# Patient Record
Sex: FEMALE | Race: OTHER | NOT HISPANIC OR LATINO | Employment: FULL TIME | URBAN - METROPOLITAN AREA
[De-identification: names, ages, dates, MRNs, and addresses within clinical notes are randomized per-mention and may not be internally consistent; named-entity substitution may affect disease eponyms.]

---
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
## 2024-01-03 ENCOUNTER — ANCILLARY PROCEDURE (OUTPATIENT)
Dept: RADIOLOGY | Facility: CLINIC | Age: 19
End: 2024-01-03
Payer: COMMERCIAL

## 2024-01-03 DIAGNOSIS — Z34.91 INITIAL OBSTETRIC VISIT IN FIRST TRIMESTER (HHS-HCC): ICD-10-CM

## 2024-01-03 PROCEDURE — 76801 OB US < 14 WKS SINGLE FETUS: CPT

## 2024-01-03 PROCEDURE — 76801 OB US < 14 WKS SINGLE FETUS: CPT | Performed by: OBSTETRICS & GYNECOLOGY

## 2024-01-05 DIAGNOSIS — O21.9 NAUSEA AND VOMITING IN PREGNANCY PRIOR TO 22 WEEKS GESTATION (HHS-HCC): Primary | ICD-10-CM

## 2024-01-05 RX ORDER — PYRIDOXINE HCL (VITAMIN B6) 25 MG
25 TABLET ORAL 3 TIMES DAILY PRN
Qty: 90 TABLET | Refills: 1 | Status: SHIPPED | OUTPATIENT
Start: 2024-01-05 | End: 2024-01-15 | Stop reason: ALTCHOICE

## 2024-01-13 PROBLEM — Z3A.10 10 WEEKS GESTATION OF PREGNANCY (HHS-HCC): Status: RESOLVED | Noted: 2023-12-27 | Resolved: 2024-01-13

## 2024-01-13 PROBLEM — Z34.91 INITIAL OBSTETRIC VISIT IN FIRST TRIMESTER (HHS-HCC): Status: RESOLVED | Noted: 2023-12-27 | Resolved: 2024-01-13

## 2024-01-13 PROBLEM — Z23 NEEDS FLU SHOT: Status: RESOLVED | Noted: 2023-12-27 | Resolved: 2024-01-13

## 2024-01-13 PROBLEM — Z34.00 SUPERVISION OF NORMAL FIRST PREGNANCY, ANTEPARTUM (HHS-HCC): Status: ACTIVE | Noted: 2023-12-27

## 2024-01-13 NOTE — PROGRESS NOTES
Subjective   Kaylyn Payton is a 18 y.o.  at 13w3d with a working estimated date of delivery of 2024, by Ultrasound who presents for Centering visit #1.     1:1 Visit:   She denies vaginal bleeding, abdominal pain, or leakage of fluid.     Patient states that nausea and vomiting are must better.     Would like Myriad testing today. Amenable to ASA 81mg.     Objective   Physical Exam  Weight: 83.9 kg (185 lb), Pregravid BMI: 33.13  Expected Total Weight Gain: 5 kg (11 lb)-9 kg (19 lb)   BP: 136/74  Fetal Heart Rate: 130      Problem List Items Addressed This Visit       Supervision of normal first pregnancy, antepartum    Overview     Desired provider in labor: [x] CNM  [] Physician  [x] Initial BMI: 33.13  [x] Prenatal Labs:   [x] Rh status: A+  [x] Genetic Screening: ordered 1/15   [x] Baby ASA: prescribed 1/15  [x] Dating confirmation with US done at [facility]     [] Anatomy US:  [] Patient added to BirthTracks  [] 1hr GCT at 24-28wks:  [] 3 hr GTT (if indicated):  [] Fetal Surveillance (if indicated):    [] Tdap (27-36wks):  [] RSV (32-36wks)  [x] Flu Shot: completed 23  [] COVID vaccine:   [] Rubella equivocal needs MMR postpartum       [] Breastfeeding  [] Pain management during labor:   [] Postpartum Birth control method:     [] GBS at 36 wks:  [] 39 weeks discussion of IOL vs. Expectant management:  [] Mode of delivery:           History of asthma    Overview     No intubations or hospitalizations           Other Visit Diagnoses       13 weeks gestation of pregnancy    -  Primary    Relevant Medications    aspirin 81 mg chewable tablet    Other Relevant Orders    Myriad Prequel Prenatal Screen    Encounter for supervision of normal first pregnancy in second trimester                Centering Session #1 Plan:   -Anatomy US ordered ;   -The following educational material was reviewed:  Group expectations/guidelines/confidentiality form  BMI; IOM recommendations for weight gain in pregnancy based on  starting BMI  Nutrition including dietary restrictions, serving sizes  Healthy lifestyle choices   -Myriad testing ordered  -Reviewed reasons to call: heavy vaginal bleeding, loss of fluid, strong pelvic pain, any questions/concerns  -RTC for next Centering visit in 4 weeks     1:1 total time 10min  Centering Visit total time 120min    CHUNG Gutiérrez-KATELYN

## 2024-01-15 ENCOUNTER — LAB (OUTPATIENT)
Dept: LAB | Facility: LAB | Age: 19
End: 2024-01-15
Payer: COMMERCIAL

## 2024-01-15 ENCOUNTER — ROUTINE PRENATAL (OUTPATIENT)
Dept: OBSTETRICS AND GYNECOLOGY | Facility: CLINIC | Age: 19
End: 2024-01-15
Payer: COMMERCIAL

## 2024-01-15 VITALS — SYSTOLIC BLOOD PRESSURE: 136 MMHG | BODY MASS INDEX: 32.77 KG/M2 | DIASTOLIC BLOOD PRESSURE: 74 MMHG | WEIGHT: 185 LBS

## 2024-01-15 DIAGNOSIS — Z34.02 ENCOUNTER FOR SUPERVISION OF NORMAL FIRST PREGNANCY IN SECOND TRIMESTER (HHS-HCC): ICD-10-CM

## 2024-01-15 DIAGNOSIS — Z34.00 SUPERVISION OF NORMAL FIRST PREGNANCY, ANTEPARTUM (HHS-HCC): ICD-10-CM

## 2024-01-15 DIAGNOSIS — Z87.09 HISTORY OF ASTHMA: ICD-10-CM

## 2024-01-15 DIAGNOSIS — Z3A.13 13 WEEKS GESTATION OF PREGNANCY (HHS-HCC): Primary | ICD-10-CM

## 2024-01-15 DIAGNOSIS — Z3A.13 13 WEEKS GESTATION OF PREGNANCY (HHS-HCC): ICD-10-CM

## 2024-01-15 PROCEDURE — S9452 NUTRITION CLASS: HCPCS | Performed by: ADVANCED PRACTICE MIDWIFE

## 2024-01-15 PROCEDURE — 36415 COLL VENOUS BLD VENIPUNCTURE: CPT

## 2024-01-15 PROCEDURE — H1002 CARECOORDINATION PRENATAL: HCPCS | Performed by: ADVANCED PRACTICE MIDWIFE

## 2024-01-15 PROCEDURE — 99213 OFFICE O/P EST LOW 20 MIN: CPT | Performed by: ADVANCED PRACTICE MIDWIFE

## 2024-01-15 PROCEDURE — 99078 GROUP HEALTH EDUCATION: CPT | Performed by: ADVANCED PRACTICE MIDWIFE

## 2024-01-15 RX ORDER — NAPROXEN SODIUM 220 MG/1
81 TABLET, FILM COATED ORAL NIGHTLY
Qty: 90 TABLET | Refills: 3 | Status: SHIPPED | OUTPATIENT
Start: 2024-01-15 | End: 2024-04-14

## 2024-01-15 NOTE — LETTER
1/15/2024    To Whom It May Concern:    Kaylyn Payton is being followed for her pregnancy at [facility] [place].  Estimated Date of Delivery: 7/19/24    Sincerely,        PHONG Gutiérrez  [facility] [place]

## 2024-01-24 ENCOUNTER — APPOINTMENT (OUTPATIENT)
Dept: OBSTETRICS AND GYNECOLOGY | Facility: CLINIC | Age: 19
End: 2024-01-24
Payer: COMMERCIAL

## 2024-01-26 ENCOUNTER — TELEPHONE (OUTPATIENT)
Dept: OBSTETRICS AND GYNECOLOGY | Facility: HOSPITAL | Age: 19
End: 2024-01-26
Payer: COMMERCIAL

## 2024-01-26 NOTE — TELEPHONE ENCOUNTER
Called patient. Number went straight to voicemail. Vague voicemail left about wanting to talk about lab results. Encouraged patient to call back and speak with RN. Will also try to send AddFleethart message.

## 2024-02-02 ENCOUNTER — APPOINTMENT (OUTPATIENT)
Dept: CARDIOLOGY | Facility: HOSPITAL | Age: 19
End: 2024-02-02
Payer: COMMERCIAL

## 2024-02-02 ENCOUNTER — HOSPITAL ENCOUNTER (EMERGENCY)
Facility: HOSPITAL | Age: 19
Discharge: HOME | End: 2024-02-02
Attending: STUDENT IN AN ORGANIZED HEALTH CARE EDUCATION/TRAINING PROGRAM
Payer: COMMERCIAL

## 2024-02-02 VITALS
DIASTOLIC BLOOD PRESSURE: 68 MMHG | HEART RATE: 106 BPM | SYSTOLIC BLOOD PRESSURE: 123 MMHG | WEIGHT: 185 LBS | RESPIRATION RATE: 16 BRPM | HEIGHT: 65 IN | BODY MASS INDEX: 30.82 KG/M2 | OXYGEN SATURATION: 100 %

## 2024-02-02 DIAGNOSIS — E16.2 HYPOGLYCEMIA: ICD-10-CM

## 2024-02-02 DIAGNOSIS — R55 VASOVAGAL SYNCOPE: Primary | ICD-10-CM

## 2024-02-02 LAB
ALBUMIN SERPL BCP-MCNC: 3.9 G/DL (ref 3.4–5)
ALP SERPL-CCNC: 52 U/L (ref 33–110)
ALT SERPL W P-5'-P-CCNC: 13 U/L (ref 7–45)
ANION GAP SERPL CALC-SCNC: 11 MMOL/L (ref 10–20)
APPEARANCE UR: CLEAR
AST SERPL W P-5'-P-CCNC: 16 U/L (ref 9–39)
BASOPHILS # BLD AUTO: 0.02 X10*3/UL (ref 0–0.1)
BASOPHILS NFR BLD AUTO: 0.2 %
BILIRUB SERPL-MCNC: 0.2 MG/DL (ref 0–1.2)
BILIRUB UR STRIP.AUTO-MCNC: NEGATIVE MG/DL
BUN SERPL-MCNC: 5 MG/DL (ref 6–23)
CALCIUM SERPL-MCNC: 9.1 MG/DL (ref 8.6–10.3)
CHLORIDE SERPL-SCNC: 106 MMOL/L (ref 98–107)
CO2 SERPL-SCNC: 24 MMOL/L (ref 21–32)
COLOR UR: YELLOW
CREAT SERPL-MCNC: 0.51 MG/DL (ref 0.5–1.05)
D DIMER PPP FEU-MCNC: 777 NG/ML FEU
EGFRCR SERPLBLD CKD-EPI 2021: >90 ML/MIN/1.73M*2
EOSINOPHIL # BLD AUTO: 0.18 X10*3/UL (ref 0–0.7)
EOSINOPHIL NFR BLD AUTO: 1.9 %
ERYTHROCYTE [DISTWIDTH] IN BLOOD BY AUTOMATED COUNT: 12.7 % (ref 11.5–14.5)
GLUCOSE BLD MANUAL STRIP-MCNC: 122 MG/DL (ref 74–99)
GLUCOSE BLD MANUAL STRIP-MCNC: 65 MG/DL (ref 74–99)
GLUCOSE SERPL-MCNC: 71 MG/DL (ref 74–99)
GLUCOSE UR STRIP.AUTO-MCNC: NEGATIVE MG/DL
HCT VFR BLD AUTO: 38.8 % (ref 36–46)
HGB BLD-MCNC: 12.7 G/DL (ref 12–16)
IMM GRANULOCYTES # BLD AUTO: 0.04 X10*3/UL (ref 0–0.7)
IMM GRANULOCYTES NFR BLD AUTO: 0.4 % (ref 0–0.9)
KETONES UR STRIP.AUTO-MCNC: NEGATIVE MG/DL
LEUKOCYTE ESTERASE UR QL STRIP.AUTO: NEGATIVE
LYMPHOCYTES # BLD AUTO: 1.93 X10*3/UL (ref 1.2–4.8)
LYMPHOCYTES NFR BLD AUTO: 20.3 %
MCH RBC QN AUTO: 30.3 PG (ref 26–34)
MCHC RBC AUTO-ENTMCNC: 32.7 G/DL (ref 32–36)
MCV RBC AUTO: 93 FL (ref 80–100)
MONOCYTES # BLD AUTO: 0.68 X10*3/UL (ref 0.1–1)
MONOCYTES NFR BLD AUTO: 7.2 %
NEUTROPHILS # BLD AUTO: 6.66 X10*3/UL (ref 1.2–7.7)
NEUTROPHILS NFR BLD AUTO: 70 %
NITRITE UR QL STRIP.AUTO: NEGATIVE
NRBC BLD-RTO: 0 /100 WBCS (ref 0–0)
PH UR STRIP.AUTO: 8 [PH]
PLATELET # BLD AUTO: 364 X10*3/UL (ref 150–450)
POTASSIUM SERPL-SCNC: 3.7 MMOL/L (ref 3.5–5.3)
PROT SERPL-MCNC: 7.3 G/DL (ref 6.4–8.2)
PROT UR STRIP.AUTO-MCNC: NEGATIVE MG/DL
RBC # BLD AUTO: 4.19 X10*6/UL (ref 4–5.2)
RBC # UR STRIP.AUTO: NEGATIVE /UL
SARS-COV-2 RNA RESP QL NAA+PROBE: NOT DETECTED
SODIUM SERPL-SCNC: 137 MMOL/L (ref 136–145)
SP GR UR STRIP.AUTO: 1.01
UROBILINOGEN UR STRIP.AUTO-MCNC: <2 MG/DL
WBC # BLD AUTO: 9.5 X10*3/UL (ref 4.4–11.3)

## 2024-02-02 PROCEDURE — 81003 URINALYSIS AUTO W/O SCOPE: CPT | Performed by: NURSE PRACTITIONER

## 2024-02-02 PROCEDURE — 93005 ELECTROCARDIOGRAM TRACING: CPT

## 2024-02-02 PROCEDURE — 99283 EMERGENCY DEPT VISIT LOW MDM: CPT | Mod: 25

## 2024-02-02 PROCEDURE — 96361 HYDRATE IV INFUSION ADD-ON: CPT

## 2024-02-02 PROCEDURE — 2500000004 HC RX 250 GENERAL PHARMACY W/ HCPCS (ALT 636 FOR OP/ED): Performed by: NURSE PRACTITIONER

## 2024-02-02 PROCEDURE — 82947 ASSAY GLUCOSE BLOOD QUANT: CPT

## 2024-02-02 PROCEDURE — 99285 EMERGENCY DEPT VISIT HI MDM: CPT | Performed by: STUDENT IN AN ORGANIZED HEALTH CARE EDUCATION/TRAINING PROGRAM

## 2024-02-02 PROCEDURE — 87635 SARS-COV-2 COVID-19 AMP PRB: CPT | Performed by: NURSE PRACTITIONER

## 2024-02-02 PROCEDURE — 96360 HYDRATION IV INFUSION INIT: CPT

## 2024-02-02 PROCEDURE — 36415 COLL VENOUS BLD VENIPUNCTURE: CPT | Performed by: NURSE PRACTITIONER

## 2024-02-02 PROCEDURE — 85025 COMPLETE CBC W/AUTO DIFF WBC: CPT | Performed by: NURSE PRACTITIONER

## 2024-02-02 PROCEDURE — 80053 COMPREHEN METABOLIC PANEL: CPT | Performed by: NURSE PRACTITIONER

## 2024-02-02 PROCEDURE — 85379 FIBRIN DEGRADATION QUANT: CPT | Performed by: NURSE PRACTITIONER

## 2024-02-02 RX ADMIN — SODIUM CHLORIDE, POTASSIUM CHLORIDE, SODIUM LACTATE AND CALCIUM CHLORIDE 1000 ML: 600; 310; 30; 20 INJECTION, SOLUTION INTRAVENOUS at 15:02

## 2024-02-02 ASSESSMENT — PAIN SCALES - GENERAL: PAINLEVEL_OUTOF10: 0 - NO PAIN

## 2024-02-02 ASSESSMENT — COLUMBIA-SUICIDE SEVERITY RATING SCALE - C-SSRS
2. HAVE YOU ACTUALLY HAD ANY THOUGHTS OF KILLING YOURSELF?: NO
1. IN THE PAST MONTH, HAVE YOU WISHED YOU WERE DEAD OR WISHED YOU COULD GO TO SLEEP AND NOT WAKE UP?: NO
6. HAVE YOU EVER DONE ANYTHING, STARTED TO DO ANYTHING, OR PREPARED TO DO ANYTHING TO END YOUR LIFE?: NO

## 2024-02-02 ASSESSMENT — PAIN - FUNCTIONAL ASSESSMENT: PAIN_FUNCTIONAL_ASSESSMENT: 0-10

## 2024-02-02 ASSESSMENT — LIFESTYLE VARIABLES
EVER FELT BAD OR GUILTY ABOUT YOUR DRINKING: NO
HAVE PEOPLE ANNOYED YOU BY CRITICIZING YOUR DRINKING: NO
EVER HAD A DRINK FIRST THING IN THE MORNING TO STEADY YOUR NERVES TO GET RID OF A HANGOVER: NO
HAVE YOU EVER FELT YOU SHOULD CUT DOWN ON YOUR DRINKING: NO

## 2024-02-02 ASSESSMENT — PAIN DESCRIPTION - PROGRESSION: CLINICAL_PROGRESSION: NOT CHANGED

## 2024-02-02 NOTE — ED PROVIDER NOTES
HPI   Chief Complaint   Patient presents with   • Syncope     Pt had a syncopal episode at work earlier today. Pt was advised by her boss to be evaluated. Pt stated she is 16 weeks pregnant. Pt is  with no complications thus far. Pt denies any history of syncope in the past. Denies being anemic and stated she is eating and drinking regularly.         Patient is a 18-year-old female who is 16 weeks OB, G2, P0, with 1 spontaneous , who presents ED today due to an episode of dizziness at work.  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  Patient's coworker states that she did pass out in her chair.  Patient denies any palpitations or chest pains.  Patient denies any nausea or vomiting.  Patient currently feels okay.  Patient states that she did eat and drink normally this morning.      History provided by:  Patient   used: No                        No data recorded                Patient History   Past Medical History:   Diagnosis Date   • Other specified health status 2015    No pertinent past surgical history   • Personal history of other diseases of the respiratory system 2015    History of asthma     No past surgical history on file.  Family History   Problem Relation Name Age of Onset   • Diabetes Mother       Social History     Tobacco Use   • Smoking status: Never   • Smokeless tobacco: Never   Vaping Use   • Vaping Use: Never used   Substance Use Topics   • Alcohol use: Not on file   • Drug use: Never       Physical Exam   ED Triage Vitals   Temp Pulse Resp BP   -- -- -- --      SpO2 Temp src Heart Rate Source Patient Position   -- -- -- --      BP Location FiO2 (%)     -- --       Physical Exam  Vitals and nursing note reviewed.   Constitutional:       General: She is not in acute distress.     Appearance: She is well-developed.   HENT:      Head: Normocephalic and atraumatic.   Eyes:      Conjunctiva/sclera:  Conjunctivae normal.   Cardiovascular:      Rate and Rhythm: Normal rate and regular rhythm.      Heart sounds: No murmur heard.  Pulmonary:      Effort: Pulmonary effort is normal. No respiratory distress.      Breath sounds: Normal breath sounds.   Abdominal:      General: Abdomen is protuberant.      Tenderness: There is no abdominal tenderness. There is no right CVA tenderness or left CVA tenderness.      Comments: Gravid uterus   Musculoskeletal:         General: No swelling.      Cervical back: Neck supple.   Skin:     General: Skin is warm and dry.      Capillary Refill: Capillary refill takes less than 2 seconds.   Neurological:      Mental Status: She is alert.   Psychiatric:         Mood and Affect: Mood normal.       ED Course & MDM   ED Course as of 02/06/24 1010   Fri Feb 02, 2024   1510 CBC and Auto Differential  CBC stable, no leukocytosis, anemia, or thrombocytopenia [WS]   1512 ECG 12 lead  EKG, my read, 1454  Normal sinus rhythm, no acute ST elevation or depression.  Ventricular rate-92 BPM [WS]   1516 Urinalysis with Reflex Culture and Microscopic  Normal Urinalysis. [WS]   1519 D-Dimer, Quantitative VTE Exclusion(!): 777  YEARS negative [AB]   1523 D-dimer, VTE Exclusion(!)  Slightly elevated blood pressure years criteria she is low risk for PE, I did discuss the possibility of CT for the patient's and risks and she would not like it at this time. [WS]   1524 Comprehensive metabolic panel(!)  No electrolyte abnormalities, kidney injury, or elevated liver enzymes consistent with liver pathology.  Patient is slightly hyperglycemic, she will be given foods and this will be rechecked. [WS]   1607 SARS-CoV-2 RT PCR  Negative [WS]   1609 Orthostatic vital signs did not show a significant increase in heart rate or drop in blood pressure. [WS]   1623 POCT GLUCOSE(!)  Patient is still alert, will be given more food and rechecked. [WS]   1726  bpm by nursing [AB]      ED Course User Index  [AB]  Glenn Solis MD  [WS] CHUNG Vences-CNP         Diagnoses as of 02/06/24 1010   Vasovagal syncope   Hypoglycemia       Medical Decision Making  Differential diagnosis: Hypoglycemia, dehydration, electrolyte normality, HELLP, UTI, PE.  Patient's vital signs initially stable, repeat vital signs during orthostatics had slight increase in heart rate initially and remained slightly elevated.  Seem to stabilize when seated.  Remaining vital signs were stable, no significant drop in blood pressure.  Patient's lab work did show mild hyperglycemia, she was given small amount of food and this did not improve, she will be given more and rechecked.  Remaining testing.  Stable.  D-dimer was slightly elevated but per years criteria does not meet criteria for CT.  I did offer as this is on the differential but patient is on at this time due to risk to fetus.  I spoke with Dr. Voss, who states that she can follow-up with her OB in the next few days as everything appears stable based on our workup.    Amount and/or Complexity of Data Reviewed  Labs: ordered. Decision-making details documented in ED Course.  ECG/medicine tests: ordered and independent interpretation performed. Decision-making details documented in ED Course.    Risk  Prescription drug management.        Procedure  Procedures     CHUNG Vences-ASUNCION  02/06/24 1010

## 2024-02-03 LAB — HOLD SPECIMEN: NORMAL

## 2024-02-06 LAB
ATRIAL RATE: 92 BPM
P AXIS: 2 DEGREES
P OFFSET: 189 MS
P ONSET: 148 MS
PR INTERVAL: 142 MS
Q ONSET: 219 MS
QRS COUNT: 15 BEATS
QRS DURATION: 80 MS
QT INTERVAL: 352 MS
QTC CALCULATION(BAZETT): 435 MS
QTC FREDERICIA: 405 MS
R AXIS: 21 DEGREES
T AXIS: 2 DEGREES
T OFFSET: 395 MS
VENTRICULAR RATE: 92 BPM

## 2024-02-07 ENCOUNTER — DOCUMENTATION (OUTPATIENT)
Dept: OBSTETRICS AND GYNECOLOGY | Facility: CLINIC | Age: 19
End: 2024-02-07
Payer: COMMERCIAL

## 2024-02-07 NOTE — PROGRESS NOTES
Pt was called at request of Tomasa Fox CNM as pt was seen earlier this month in er as she had a dizzy spell and passed out at work. Pt was worked up by ER. Pt states she is feeling better but will want to talk to CNM at appointment on Monday. Also spoke to pt about not communicating for urgent mtters via my chart as we may not see message urgently. Pt advised if it is non urgen to use my chart- but for any urgent issue it is best to call us. Pt verbalizes understanding

## 2024-02-10 PROBLEM — Z34.00 SUPERVISION OF NORMAL FIRST PREGNANCY, ANTEPARTUM (HHS-HCC): Status: RESOLVED | Noted: 2023-12-27 | Resolved: 2024-02-10

## 2024-02-10 NOTE — PROGRESS NOTES
Subjective     Kaylyn Payton is a 18 y.o.  at 17w3d with a working estimated date of delivery of 2024, by Ultrasound who presents for a routine prenatal visit.     She denies vaginal bleeding, leakage of fluid, decreased fetal movements, or contractions.    Patient eating, has food at home. Wondering about not gaining weight so far.     Patient seen in ED for syncope. Since then she has been working on making sure she is eating enough and drinking enough water.     Objective   Physical Exam  Weight: 83.9 kg (185 lb)  Expected Total Weight Gain: 5 kg (11 lb)-9 kg (19 lb)   Pregravid BMI: 31.12  BP: 118/77  Fetal Heart Rate: 145 Fundal Height (cm): 18 cm      Problem List Items Addressed This Visit       Supervision of normal first pregnancy, antepartum - Primary    Overview     Desired provider in labor: [x] CNM  [] Physician  [x] Initial BMI: 33.13  [x] Prenatal Labs:   [x] Rh status: A+  [x] Genetic Screening: rr cf DNA   [x] Baby ASA: prescribed 1/15  [x] Dating confirmation with US done at [facility]     [] Anatomy US:  [x] Fetal Sex: female  [] Patient added to BirthTracks  [] 1hr GCT at 24-28wks:  [] 3 hr GTT (if indicated):  [] Fetal Surveillance (if indicated):    [] Tdap (27-36wks):  [] RSV (32-36wks)  [x] Flu Shot: completed 23  [] Rubella equivocal needs MMR postpartum       [] Breastfeeding  [] Pain management during labor:   [] Postpartum Birth control method:     [] GBS at 36 wks:  [] 39 weeks discussion of IOL vs. Expectant management:  [] Mode of delivery:           Syncope    Overview     Encouraged high protein and increasing water intake             Anatomy US scheduled ;  -Reviewed reasons to call CNM on-call: vaginal bleeding, loss of fluid, increased pelvic pain/contractions, or any questions/concerns  -RTC in 4 weeks or prn for next Centering visit     CHUNG Gutiérrez-KATELYN

## 2024-02-12 ENCOUNTER — ROUTINE PRENATAL (OUTPATIENT)
Dept: OBSTETRICS AND GYNECOLOGY | Facility: CLINIC | Age: 19
End: 2024-02-12
Payer: COMMERCIAL

## 2024-02-12 VITALS — SYSTOLIC BLOOD PRESSURE: 118 MMHG | WEIGHT: 185 LBS | DIASTOLIC BLOOD PRESSURE: 77 MMHG | BODY MASS INDEX: 30.79 KG/M2

## 2024-02-12 DIAGNOSIS — R55 SYNCOPE, UNSPECIFIED SYNCOPE TYPE: ICD-10-CM

## 2024-02-12 DIAGNOSIS — Z34.00 SUPERVISION OF NORMAL FIRST PREGNANCY, ANTEPARTUM (HHS-HCC): Primary | ICD-10-CM

## 2024-02-12 PROCEDURE — 99213 OFFICE O/P EST LOW 20 MIN: CPT | Performed by: ADVANCED PRACTICE MIDWIFE

## 2024-02-12 PROCEDURE — H1002 CARECOORDINATION PRENATAL: HCPCS | Performed by: ADVANCED PRACTICE MIDWIFE

## 2024-02-12 PROCEDURE — 99078 GROUP HEALTH EDUCATION: CPT | Performed by: ADVANCED PRACTICE MIDWIFE

## 2024-02-28 ENCOUNTER — HOSPITAL ENCOUNTER (OUTPATIENT)
Dept: RADIOLOGY | Facility: CLINIC | Age: 19
Discharge: HOME | End: 2024-02-28
Payer: COMMERCIAL

## 2024-02-28 DIAGNOSIS — Z36.2 ENCOUNTER FOR OTHER ANTENATAL SCREENING FOLLOW-UP (HHS-HCC): ICD-10-CM

## 2024-02-28 PROCEDURE — 76811 OB US DETAILED SNGL FETUS: CPT

## 2024-02-28 PROCEDURE — 76811 OB US DETAILED SNGL FETUS: CPT | Performed by: OBSTETRICS & GYNECOLOGY

## 2024-03-04 ENCOUNTER — TELEPHONE (OUTPATIENT)
Dept: OBSTETRICS AND GYNECOLOGY | Facility: CLINIC | Age: 19
End: 2024-03-04
Payer: COMMERCIAL

## 2024-03-07 ENCOUNTER — TELEPHONE (OUTPATIENT)
Dept: OBSTETRICS AND GYNECOLOGY | Facility: CLINIC | Age: 19
End: 2024-03-07
Payer: COMMERCIAL

## 2024-03-07 NOTE — TELEPHONE ENCOUNTER
----- Message from Darlene Barraza RN sent at 3/4/2024  2:22 PM EST -----  Regarding: FW: Phone call from dr ariza  Contact: [phone]  [facility]  ----- Message -----  From: Aye Mckee  Sent: 3/4/2024   2:18 PM EST  To: Yovani Hunter Clinical Support Staff  Subject: FW: Phone call from dr ariza                     ----- Message -----  From: Kaylyn Payton  Sent: 3/1/2024  10:17 PM EST  To: [facility] [employer]  Subject: Phone call from dr annita French   I know you won’t get this right away and it’s not urgent but i have a few questions i needed to ask you and get some information on before our next appointment so if you could call me at your earliest convince thank you!

## 2024-03-08 NOTE — PROGRESS NOTES
Subjective   Kaylyn Payton is a 18 y.o.  at 21w3d with a working estimated date of delivery of 2024, by Ultrasound who presents for Centering #3.     1:1 Visit:  Patient doing ok. She was able to talk with a  provider last week about her ADHD medication and so feels much more in control of that.     She denies vaginal bleeding, leakage of fluid, decreased fetal movements, or contractions/strong pelvic pain. Patient denies any more syncope. Happy about weight gain.     Objective   Physical Exam:   Weight: 86.3 kg (190 lb 4.8 oz)  Expected Total Weight Gain: 5 kg (11 lb)-9 kg (19 lb)   Pregravid BMI: 31.12  BP: 143/88 --> patient left before she was able to have BP rechecked though we talked with her about that need  Fetal Heart Rate: 155 Fundal Height (cm): 22 cm             Problem List Items Addressed This Visit       Supervision of normal first pregnancy, antepartum - Primary    Overview     Desired provider in labor: [x] CNM  [] Physician  [x] Initial BMI: 33.13  [x] Prenatal Labs: WNL except rubella non immune  [x] Rh status: A+  [x] Genetic Screening: rr cf DNA   [x] Baby ASA: prescribed 1/15  [x] Dating confirmation with US done at [facility]     [x] Anatomy US: WNL   [x] Fetal Sex: female  [] Patient added to BirthTracks  [] 1hr GCT at 24-28wks:  [] 3 hr GTT (if indicated):  [] Fetal Surveillance (if indicated):    [] Tdap (27-36wks):  [] RSV (32-36wks)  [x] Flu Shot: completed 23    [] Breastfeeding  [] Pain management during labor:   [] Postpartum Birth control method:   [] GBS at 36 wks:  [] Labor support:         Elevated BP without diagnosis of hypertension    Overview     3/11: 143/88, patient left before repeat was possible            Centering Session #3 Plan:   Discussed diabetes screening and routine labs, to be completed next visit  -The following educational material was reviewed:  Dealing with stressors  Mental relaxation practice  Intimate partner violence    labor  signs/symptoms  -Reviewed reasons to call CNM on-call:  vaginal bleeding, loss of fluid, severe pelvic pain, or any questions/concerns  -Patient called and texted after visit about need to RTC asap for BP recheck  -RTC for next Centering visit in 4 weeks or prn   *next visit: GCT, BP    1:1 total time 10min  Centering Visit total time 120min     CHUNG Gutiérrez-KATELYN

## 2024-03-11 ENCOUNTER — ROUTINE PRENATAL (OUTPATIENT)
Dept: OBSTETRICS AND GYNECOLOGY | Facility: CLINIC | Age: 19
End: 2024-03-11
Payer: COMMERCIAL

## 2024-03-11 VITALS — BODY MASS INDEX: 31.67 KG/M2 | WEIGHT: 190.3 LBS | DIASTOLIC BLOOD PRESSURE: 88 MMHG | SYSTOLIC BLOOD PRESSURE: 143 MMHG

## 2024-03-11 DIAGNOSIS — R03.0 ELEVATED BP WITHOUT DIAGNOSIS OF HYPERTENSION: ICD-10-CM

## 2024-03-11 DIAGNOSIS — Z34.00 SUPERVISION OF NORMAL FIRST PREGNANCY, ANTEPARTUM (HHS-HCC): Primary | ICD-10-CM

## 2024-03-11 PROCEDURE — 99078 GROUP HEALTH EDUCATION: CPT | Performed by: ADVANCED PRACTICE MIDWIFE

## 2024-03-11 PROCEDURE — 99213 OFFICE O/P EST LOW 20 MIN: CPT | Performed by: ADVANCED PRACTICE MIDWIFE

## 2024-03-11 PROCEDURE — H1002 CARECOORDINATION PRENATAL: HCPCS | Performed by: ADVANCED PRACTICE MIDWIFE

## 2024-03-11 ASSESSMENT — EDINBURGH POSTNATAL DEPRESSION SCALE (EPDS)
I HAVE BEEN SO UNHAPPY THAT I HAVE BEEN CRYING: ONLY OCCASIONALLY
I HAVE BEEN ANXIOUS OR WORRIED FOR NO GOOD REASON: YES, SOMETIMES
TOTAL SCORE: 9
I HAVE FELT SAD OR MISERABLE: NOT VERY OFTEN
THE THOUGHT OF HARMING MYSELF HAS OCCURRED TO ME: NEVER
I HAVE BLAMED MYSELF UNNECESSARILY WHEN THINGS WENT WRONG: NOT VERY OFTEN
I HAVE BEEN ABLE TO LAUGH AND SEE THE FUNNY SIDE OF THINGS: AS MUCH AS I ALWAYS COULD
I HAVE LOOKED FORWARD WITH ENJOYMENT TO THINGS: RATHER LESS THAN I USED TO
I HAVE FELT SCARED OR PANICKY FOR NO GOOD REASON: YES, SOMETIMES
THINGS HAVE BEEN GETTING ON TOP OF ME: NO, MOST OF THE TIME I HAVE COPED QUITE WELL
I HAVE BEEN SO UNHAPPY THAT I HAVE HAD DIFFICULTY SLEEPING: NOT AT ALL

## 2024-03-21 DIAGNOSIS — M54.30 SCIATICA, UNSPECIFIED LATERALITY: Primary | ICD-10-CM

## 2024-04-02 NOTE — PROGRESS NOTES
Subjective   Kaylyn Payton is a 18 y.o.  at 25w3d with a working estimated date of delivery of 2024, by Ultrasound who presents for Centering #4.    1:1 Visit:  She denies vaginal bleeding, leakage of fluid, or strong/consistent contractions. Endorses good fetal movement.     Patient has another episodes of dizziness at work yesterday. She was eating/drinking enough. She started feeling hot/had blurry vision and ate mac and cheese.     Sciatic pain still bad, but she starts PT next week.     In middle of GCT.     Objective   Physical Exam  Weight: 88.5 kg (195 lb)  Expected Total Weight Gain: 5 kg (11 lb)-9 kg (19 lb)   Pregravid BMI: 31.12  BP: 112/79  Fetal Heart Rate: 25 Fundal Height (cm): 140 cm    Problem List Items Addressed This Visit       Supervision of normal first pregnancy, antepartum    Overview     Desired provider in labor: [x] CNM  [] Physician  [x] Initial BMI: 33.13  [x] Prenatal Labs: WNL except rubella non immune  [x] Rh status: A+  [x] Genetic Screening: rr cf DNA   [x] Baby ASA: prescribed 1/15  [x] Dating confirmation with US done at [facility]     [x] Anatomy US: WNL   [x] Fetal Sex: female  [] Patient added to BirthTracks  [] 1hr GCT at 24-28wks:  [] 3 hr GTT (if indicated):  [] Fetal Surveillance (if indicated):    [] Tdap (27-36wks):  [] RSV (32-36wks)  [x] Flu Shot: completed 23    [] Breastfeeding  [] Pain management during labor:   [x] Postpartum Birth control method:  Mirena IUD immediate PP  [] GBS at 36 wks:  [] Labor support:         Elevated BP without diagnosis of hypertension    Overview     3/11: 143/88, patient left before repeat was possible          Other Visit Diagnoses       24 weeks gestation of pregnancy    -  Primary    Relevant Orders    CBC Anemia Panel With Reflex,Pregnancy    Glucose, 1 Hour Screen, Pregnancy    Syphilis Screen with Reflex            Centering Session #4 Plan:   Discussed diabetes screening and routine labs, to be completed  today  Benefits of breastfeeding discussed with patient, breast pump ordered  -The following educational material was reviewed:  Birth control  Breastfeeding benefits   Family I want to have   -Reviewed reasons to call CNM on-call: vaginal bleeding, loss of fluid, increased pelvic pain/contractions, or any questions/concerns  -RTC in 2 weeks for next Centering visit or prn  *next visit: Tdap     1:1 total time 10min  Centering Visit total time 120min    CHUNG Gutiérrez-KATELYN

## 2024-04-08 ENCOUNTER — ROUTINE PRENATAL (OUTPATIENT)
Dept: OBSTETRICS AND GYNECOLOGY | Facility: CLINIC | Age: 19
End: 2024-04-08
Payer: COMMERCIAL

## 2024-04-08 ENCOUNTER — LAB (OUTPATIENT)
Dept: LAB | Facility: LAB | Age: 19
End: 2024-04-08
Payer: COMMERCIAL

## 2024-04-08 VITALS — DIASTOLIC BLOOD PRESSURE: 79 MMHG | BODY MASS INDEX: 32.45 KG/M2 | WEIGHT: 195 LBS | SYSTOLIC BLOOD PRESSURE: 112 MMHG

## 2024-04-08 DIAGNOSIS — Z34.00 SUPERVISION OF NORMAL FIRST PREGNANCY, ANTEPARTUM (HHS-HCC): ICD-10-CM

## 2024-04-08 DIAGNOSIS — Z3A.25 25 WEEKS GESTATION OF PREGNANCY (HHS-HCC): Primary | ICD-10-CM

## 2024-04-08 DIAGNOSIS — Z3A.24 24 WEEKS GESTATION OF PREGNANCY (HHS-HCC): ICD-10-CM

## 2024-04-08 DIAGNOSIS — R03.0 ELEVATED BP WITHOUT DIAGNOSIS OF HYPERTENSION: ICD-10-CM

## 2024-04-08 LAB
ERYTHROCYTE [DISTWIDTH] IN BLOOD BY AUTOMATED COUNT: 13.5 % (ref 11.5–14.5)
FERRITIN SERPL-MCNC: 19 NG/ML
FOLATE SERPL-MCNC: 12.9 NG/ML
GLUCOSE 1H P 50 G GLC PO SERPL-MCNC: 127 MG/DL
HCT VFR BLD AUTO: 33.2 % (ref 36–46)
HGB BLD-MCNC: 10.4 G/DL (ref 12–16)
IRON SATN MFR SERPL: 8 %
IRON SERPL-MCNC: 34 UG/DL
MCH RBC QN AUTO: 29.5 PG (ref 26–34)
MCHC RBC AUTO-ENTMCNC: 31.3 G/DL (ref 32–36)
MCV RBC AUTO: 94 FL (ref 80–100)
NRBC BLD-RTO: 0 /100 WBCS (ref 0–0)
PLATELET # BLD AUTO: 351 X10*3/UL (ref 150–450)
RBC # BLD AUTO: 3.53 X10*6/UL (ref 4–5.2)
REFLEX ADDED, ANEMIA PANEL: NORMAL
TIBC SERPL-MCNC: 449 UG/DL
TREPONEMA PALLIDUM IGG+IGM AB [PRESENCE] IN SERUM OR PLASMA BY IMMUNOASSAY: NONREACTIVE
UIBC SERPL-MCNC: 415 UG/DL
VIT B12 SERPL-MCNC: 227 PG/ML
WBC # BLD AUTO: 10.4 X10*3/UL (ref 4.4–11.3)

## 2024-04-08 PROCEDURE — 99078 GROUP HEALTH EDUCATION: CPT | Performed by: ADVANCED PRACTICE MIDWIFE

## 2024-04-08 PROCEDURE — 99213 OFFICE O/P EST LOW 20 MIN: CPT | Performed by: ADVANCED PRACTICE MIDWIFE

## 2024-04-08 PROCEDURE — 83550 IRON BINDING TEST: CPT

## 2024-04-08 PROCEDURE — 82746 ASSAY OF FOLIC ACID SERUM: CPT

## 2024-04-08 PROCEDURE — 82607 VITAMIN B-12: CPT

## 2024-04-08 PROCEDURE — 82728 ASSAY OF FERRITIN: CPT

## 2024-04-08 PROCEDURE — 85027 COMPLETE CBC AUTOMATED: CPT

## 2024-04-08 PROCEDURE — 86780 TREPONEMA PALLIDUM: CPT

## 2024-04-08 PROCEDURE — 36415 COLL VENOUS BLD VENIPUNCTURE: CPT

## 2024-04-08 PROCEDURE — 82947 ASSAY GLUCOSE BLOOD QUANT: CPT

## 2024-04-08 PROCEDURE — H1002 CARECOORDINATION PRENATAL: HCPCS | Performed by: ADVANCED PRACTICE MIDWIFE

## 2024-04-09 DIAGNOSIS — D50.9 IRON DEFICIENCY ANEMIA, UNSPECIFIED IRON DEFICIENCY ANEMIA TYPE: ICD-10-CM

## 2024-04-09 DIAGNOSIS — Z3A.25 25 WEEKS GESTATION OF PREGNANCY (HHS-HCC): Primary | ICD-10-CM

## 2024-04-09 RX ORDER — DOCUSATE SODIUM 100 MG/1
100 CAPSULE, LIQUID FILLED ORAL NIGHTLY PRN
Qty: 30 CAPSULE | Refills: 3 | Status: SHIPPED | OUTPATIENT
Start: 2024-04-09

## 2024-04-09 RX ORDER — QUINIDINE GLUCONATE 324 MG
480 TABLET, EXTENDED RELEASE ORAL EVERY OTHER DAY
Qty: 60 TABLET | Refills: 3 | Status: SHIPPED | OUTPATIENT
Start: 2024-04-09 | End: 2024-05-13 | Stop reason: SDUPTHER

## 2024-04-16 ENCOUNTER — HOSPITAL ENCOUNTER (EMERGENCY)
Facility: HOSPITAL | Age: 19
Discharge: OTHER INSTITUTION | End: 2024-04-17
Attending: EMERGENCY MEDICINE
Payer: COMMERCIAL

## 2024-04-16 DIAGNOSIS — O26.892 ABDOMINAL PAIN DURING PREGNANCY IN SECOND TRIMESTER (HHS-HCC): Primary | ICD-10-CM

## 2024-04-16 DIAGNOSIS — R10.9 ABDOMINAL PAIN DURING PREGNANCY IN SECOND TRIMESTER (HHS-HCC): Primary | ICD-10-CM

## 2024-04-16 PROCEDURE — 99285 EMERGENCY DEPT VISIT HI MDM: CPT

## 2024-04-16 ASSESSMENT — LIFESTYLE VARIABLES
EVER HAD A DRINK FIRST THING IN THE MORNING TO STEADY YOUR NERVES TO GET RID OF A HANGOVER: NO
HAVE PEOPLE ANNOYED YOU BY CRITICIZING YOUR DRINKING: NO
HAVE YOU EVER FELT YOU SHOULD CUT DOWN ON YOUR DRINKING: NO
TOTAL SCORE: 0
EVER FELT BAD OR GUILTY ABOUT YOUR DRINKING: NO

## 2024-04-16 ASSESSMENT — PAIN - FUNCTIONAL ASSESSMENT: PAIN_FUNCTIONAL_ASSESSMENT: 0-10

## 2024-04-16 ASSESSMENT — COLUMBIA-SUICIDE SEVERITY RATING SCALE - C-SSRS
6. HAVE YOU EVER DONE ANYTHING, STARTED TO DO ANYTHING, OR PREPARED TO DO ANYTHING TO END YOUR LIFE?: NO
2. HAVE YOU ACTUALLY HAD ANY THOUGHTS OF KILLING YOURSELF?: NO
1. IN THE PAST MONTH, HAVE YOU WISHED YOU WERE DEAD OR WISHED YOU COULD GO TO SLEEP AND NOT WAKE UP?: NO

## 2024-04-16 ASSESSMENT — PAIN DESCRIPTION - DESCRIPTORS: DESCRIPTORS: ACHING;CRAMPING

## 2024-04-16 ASSESSMENT — PAIN DESCRIPTION - ONSET: ONSET: OTHER (COMMENT)

## 2024-04-16 ASSESSMENT — PAIN DESCRIPTION - PAIN TYPE: TYPE: ACUTE PAIN

## 2024-04-16 ASSESSMENT — PAIN SCALES - GENERAL: PAINLEVEL_OUTOF10: 6

## 2024-04-16 ASSESSMENT — PAIN DESCRIPTION - LOCATION: LOCATION: ABDOMEN

## 2024-04-16 ASSESSMENT — PAIN DESCRIPTION - ORIENTATION: ORIENTATION: LOWER;MID

## 2024-04-17 ENCOUNTER — HOSPITAL ENCOUNTER (INPATIENT)
Age: 19
End: 2024-04-17
Attending: OBSTETRICS & GYNECOLOGY | Admitting: OBSTETRICS & GYNECOLOGY
Payer: COMMERCIAL

## 2024-04-17 ENCOUNTER — HOSPITAL ENCOUNTER (INPATIENT)
Facility: HOSPITAL | Age: 19
End: 2024-04-17
Attending: OBSTETRICS & GYNECOLOGY | Admitting: OBSTETRICS & GYNECOLOGY
Payer: COMMERCIAL

## 2024-04-17 VITALS
RESPIRATION RATE: 16 BRPM | DIASTOLIC BLOOD PRESSURE: 83 MMHG | WEIGHT: 188 LBS | TEMPERATURE: 97 F | HEIGHT: 64 IN | BODY MASS INDEX: 32.1 KG/M2 | HEART RATE: 110 BPM | SYSTOLIC BLOOD PRESSURE: 134 MMHG | OXYGEN SATURATION: 99 %

## 2024-04-17 NOTE — ED TRIAGE NOTES
Pt arrived to the ED with c/o moderate abdominal cramping. Pt states she is 26 weeks pregnant and she tried contacting her OB Doc but there was no call back. Pt states she had a miscarriage in June of 2023 where she was 8 weeks. Pt sad she had light spotting earlier today.

## 2024-04-17 NOTE — ED PROVIDER NOTES
HPI   Chief Complaint   Patient presents with    Abdominal Cramping     Pt arrived to the ED with c/o moderate abdominal cramping. Pt states she is 26 weeks pregnant and she tried contacting her OB Doc but there was no call back. Pt states she had a miscarriage in 2023 where she was 8 weeks. Pt sad she had light spotting earlier today.        Patient is 26 weeks pregnant and is  A1.  She has received prenatal care through nurse practitioner with [facility].  She is having abdominal cramping for the past 2 hours that lasts 1 to 2 minutes at a time and occurs every 10 minutes.  She had some spotting earlier today which has since resolved.  She denies any other symptoms.  No complications with this pregnancy.  Positive fetal movement.  No leakage of fluid.                          [place] Coma Scale Score: 15                     Patient History   Past Medical History:   Diagnosis Date    ADHD     Asthma ([facility])      History reviewed. No pertinent surgical history.  Family History   Problem Relation Name Age of Onset    Diabetes Mother       Social History     Tobacco Use    Smoking status: Never    Smokeless tobacco: Never   Vaping Use    Vaping status: Never Used   Substance Use Topics    Alcohol use: Not on file    Drug use: Never       Physical Exam   ED Triage Vitals [24 2316]   Temperature Heart Rate Respirations BP   36.1 °C (97 °F) (!) 109 16 145/85      Pulse Ox Temp Source Heart Rate Source Patient Position   100 % Temporal -- --      BP Location FiO2 (%)     -- --       Physical Exam  Vitals and nursing note reviewed.   Constitutional:       General: She is not in acute distress.     Appearance: She is well-developed.   HENT:      Head: Normocephalic and atraumatic.   Eyes:      Conjunctiva/sclera: Conjunctivae normal.   Cardiovascular:      Rate and Rhythm: Normal rate and regular rhythm.      Heart sounds: No murmur heard.  Pulmonary:      Effort: Pulmonary effort is normal.  No respiratory distress.      Breath sounds: Normal breath sounds.   Abdominal:      Palpations: Abdomen is soft.      Tenderness: There is no abdominal tenderness. There is no guarding.   Musculoskeletal:         General: No swelling.      Cervical back: Neck supple.   Skin:     General: Skin is warm and dry.      Capillary Refill: Capillary refill takes less than 2 seconds.   Neurological:      Mental Status: She is alert.   Psychiatric:         Mood and Affect: Mood normal.         ED Course & MDM   Diagnoses as of 24 0017   Abdominal pain during pregnancy in second trimester ([facility])       Medical Decision Making  Differential diagnosis is  labor, ligament pain, abdominal pain, etc.    Patient's fetal heart tones are 136.  Positive fetal movement on ultrasound.  Her cramping has not been active while here in the emergency department.  She is resting comfortably.  She wants to go to private vehicle for monitoring.  She was accepted by Dr. Bliss and will go to [facility] 2 triage.  Prior medical records were reviewed.        Procedure  Procedures     Abraham Villarreal MD  24 0019

## 2024-04-22 ENCOUNTER — TELEPHONE (OUTPATIENT)
Dept: OBSTETRICS AND GYNECOLOGY | Facility: CLINIC | Age: 19
End: 2024-04-22

## 2024-04-29 ENCOUNTER — APPOINTMENT (OUTPATIENT)
Dept: OBSTETRICS AND GYNECOLOGY | Facility: CLINIC | Age: 19
End: 2024-04-29
Payer: COMMERCIAL

## 2024-05-06 ENCOUNTER — APPOINTMENT (OUTPATIENT)
Dept: OBSTETRICS AND GYNECOLOGY | Facility: CLINIC | Age: 19
End: 2024-05-06
Payer: COMMERCIAL

## 2024-05-13 ENCOUNTER — APPOINTMENT (OUTPATIENT)
Dept: OBSTETRICS AND GYNECOLOGY | Facility: CLINIC | Age: 19
End: 2024-05-13
Payer: COMMERCIAL

## 2024-05-13 ENCOUNTER — CLINICAL SUPPORT (OUTPATIENT)
Dept: OBSTETRICS AND GYNECOLOGY | Facility: HOSPITAL | Age: 19
End: 2024-05-13
Payer: COMMERCIAL

## 2024-05-13 ENCOUNTER — INITIAL PRENATAL (OUTPATIENT)
Dept: OBSTETRICS AND GYNECOLOGY | Facility: HOSPITAL | Age: 19
End: 2024-05-13
Payer: COMMERCIAL

## 2024-05-13 VITALS — BODY MASS INDEX: 32.96 KG/M2 | SYSTOLIC BLOOD PRESSURE: 128 MMHG | DIASTOLIC BLOOD PRESSURE: 87 MMHG | WEIGHT: 192 LBS

## 2024-05-13 VITALS — DIASTOLIC BLOOD PRESSURE: 87 MMHG | BODY MASS INDEX: 32.96 KG/M2 | WEIGHT: 192 LBS | SYSTOLIC BLOOD PRESSURE: 127 MMHG

## 2024-05-13 DIAGNOSIS — O36.8131 DECREASED FETAL MOVEMENTS IN THIRD TRIMESTER, FETUS 1 OF MULTIPLE GESTATION (HHS-HCC): Primary | ICD-10-CM

## 2024-05-13 DIAGNOSIS — Z3A.25 25 WEEKS GESTATION OF PREGNANCY (HHS-HCC): ICD-10-CM

## 2024-05-13 DIAGNOSIS — R03.0 ELEVATED BP WITHOUT DIAGNOSIS OF HYPERTENSION: ICD-10-CM

## 2024-05-13 DIAGNOSIS — Z34.00 SUPERVISION OF NORMAL FIRST PREGNANCY, ANTEPARTUM (HHS-HCC): ICD-10-CM

## 2024-05-13 DIAGNOSIS — D50.9 IRON DEFICIENCY ANEMIA, UNSPECIFIED IRON DEFICIENCY ANEMIA TYPE: ICD-10-CM

## 2024-05-13 DIAGNOSIS — R55 SYNCOPE, UNSPECIFIED SYNCOPE TYPE: ICD-10-CM

## 2024-05-13 PROCEDURE — 90715 TDAP VACCINE 7 YRS/> IM: CPT | Performed by: ADVANCED PRACTICE MIDWIFE

## 2024-05-13 PROCEDURE — 99214 OFFICE O/P EST MOD 30 MIN: CPT | Performed by: ADVANCED PRACTICE MIDWIFE

## 2024-05-13 PROCEDURE — 59025 FETAL NON-STRESS TEST: CPT | Performed by: ADVANCED PRACTICE MIDWIFE

## 2024-05-13 RX ORDER — DEXTROAMPHETAMINE SACCHARATE, AMPHETAMINE ASPARTATE MONOHYDRATE, DEXTROAMPHETAMINE SULFATE AND AMPHETAMINE SULFATE 5; 5; 5; 5 MG/1; MG/1; MG/1; MG/1
20 CAPSULE, EXTENDED RELEASE ORAL EVERY MORNING
COMMUNITY

## 2024-05-13 RX ORDER — QUINIDINE GLUCONATE 324 MG
480 TABLET, EXTENDED RELEASE ORAL EVERY OTHER DAY
Qty: 60 TABLET | Refills: 3 | Status: SHIPPED | OUTPATIENT
Start: 2024-05-13 | End: 2025-05-13

## 2024-05-13 ASSESSMENT — ENCOUNTER SYMPTOMS
ENDOCRINE NEGATIVE: 0
CONSTITUTIONAL NEGATIVE: 0
CARDIOVASCULAR NEGATIVE: 0
MUSCULOSKELETAL NEGATIVE: 0
RESPIRATORY NEGATIVE: 0
NEUROLOGICAL NEGATIVE: 0
PSYCHIATRIC NEGATIVE: 0
ALLERGIC/IMMUNOLOGIC NEGATIVE: 0
HEMATOLOGIC/LYMPHATIC NEGATIVE: 0
GASTROINTESTINAL NEGATIVE: 0
EYES NEGATIVE: 0

## 2024-05-13 NOTE — PROCEDURES
Kaylyn Payton, a  at 30w3d with an ADRIAN of 2024, by Ultrasound, was seen at [facility] for a nonstress test.    Non-Stress Test   Baseline Fetal Heart Rate for Non-Stress Test: 145 BPM  Variability in Waveform for Non-Stress Test: Moderate  Accelerations in Non-Stress Test: Yes, greater than/equal to 15 bpm, lasting at least 15 seconds  Decelerations in Non-Stress Test: None  Contractions in Non-Stress Test: Not present  Interpretation of Non-Stress Test   Interpretation of Non-Stress Test: Reactive               Marie Perry, APRN-CNM, APRN-CNP

## 2024-05-13 NOTE — PROGRESS NOTES
Subjective     Kaylyn Payton is a 18 y.o.  at 30w3d with a working estimated date of delivery of 2024, by Ultrasound who presents for a routine prenatal visit. Here with her Mother. Transfer from [facility].     She denies vaginal bleeding, leakage of fluid, or contractions.  Reports decreased fetal movement off and on for the past week.     Objective   Physical Exam:   Weight: 87.1 kg (192 lb)  Expected Total Weight Gain: 5 kg (11 lb)-9 kg (19 lb)   Pregravid BMI: 32.08  BP: 127/87  Fetal Heart Rate: 140 Fundal Height (cm): 30 cm Presentation: Vertex           Postpartum Depression: Medium Risk (3/11/2024)    [place]  Depression Scale     Last EPDS Total Score: 9     Last EPDS Self Harm Result: Never        Problem List Items Addressed This Visit       Syncope    Overview     Encouraged high protein and increasing water intake          Supervision of normal first pregnancy, antepartum ([facility])    Overview     Desired provider in labor: [x] CNM  [] Physician  [x] Initial BMI: 33.13  [x] Prenatal Labs: WNL except rubella non immune  [x] Rh status: A+  [x] Genetic Screening: rr cf DNA   [x] Baby ASA: prescribed 1/15  [x] Dating confirmation with US done at [facility]     [x] Anatomy US: WNL   [x] Fetal Sex: female  [x] Patient added to BirthTracks  [x] 1hr GCT at 24-28wks: WNL at 25w    [] Tdap (27-36wks):  [] RSV (32-36wks)  [x] Flu Shot: completed 23    [] Breastfeeding  [] Pain management during labor:   [x] Postpartum Birth control method:  Mirena IUD immediate PP  [] GBS at 36 wks:  [] Labor support:         Iron deficiency anemia    Overview     Lab Results   Component Value Date    WBC 10.4 2024    HGB 10.4 (L) 2024    HCT 33.2 (L) 2024    MCV 94 2024     2024: Started on iron           Relevant Medications    ferrous gluconate (Fergon) 240 (27 Fe) MG tablet    Other Relevant Orders    US MAC OB imaging order    Elevated BP without  diagnosis of hypertension    Overview     3/11: 143/88, patient left before repeat was possible          Other Visit Diagnoses       Decreased fetal movements in third trimester, fetus 1 of multiple gestation ([facility])    -  Primary    Relevant Orders    Fetal nonstress test, once    25 weeks gestation of pregnancy ([facility])        Relevant Medications    ferrous gluconate (Fergon) 240 (27 Fe) MG tablet            Reviewed lab results - 1 hr gtt WNL. Patient slightly anemic - has not started iron rx yet, reports she did not get script - wasn't at pharmacy. Rx re-sent, encouraged patient to take as directed , reviewed iron rich foods   Reviewed growth US ordered - patient to schedule   Tdap today   NST today for decreased fetal movement - reactive   Reviewed s/sx of PTL, warning signs, fetal movement counts, and when to call provider  Follow up in 2 week for a routine prenatal visit.      Marie Perry, APRN-CNM, APRN-CNP

## 2024-05-13 NOTE — PROGRESS NOTES
Pt identified by name and .  Pt here for NST.  toco's applied to pt.  Reason for NST decreased fetal movement   GA 30-3  Strip reviewed by Anant

## 2024-05-20 ENCOUNTER — APPOINTMENT (OUTPATIENT)
Dept: OBSTETRICS AND GYNECOLOGY | Facility: CLINIC | Age: 19
End: 2024-05-20
Payer: COMMERCIAL

## 2024-05-21 ENCOUNTER — HOSPITAL ENCOUNTER (OUTPATIENT)
Dept: RADIOLOGY | Facility: HOSPITAL | Age: 19
Discharge: HOME | End: 2024-05-21
Payer: COMMERCIAL

## 2024-05-21 DIAGNOSIS — D50.9 IRON DEFICIENCY ANEMIA, UNSPECIFIED IRON DEFICIENCY ANEMIA TYPE: ICD-10-CM

## 2024-05-21 PROCEDURE — 76816 OB US FOLLOW-UP PER FETUS: CPT

## 2024-05-21 PROCEDURE — 76819 FETAL BIOPHYS PROFIL W/O NST: CPT | Performed by: OBSTETRICS & GYNECOLOGY

## 2024-05-21 PROCEDURE — 76816 OB US FOLLOW-UP PER FETUS: CPT | Performed by: OBSTETRICS & GYNECOLOGY

## 2024-05-21 PROCEDURE — 76819 FETAL BIOPHYS PROFIL W/O NST: CPT

## 2024-05-28 ASSESSMENT — ENCOUNTER SYMPTOMS
DYSURIA: 0
TINGLING: 0
ABDOMINAL PAIN: 0
WEAKNESS: 0
WEIGHT LOSS: 0
PARESIS: 0
PERIANAL NUMBNESS: 0
BACK PAIN: 1
NUMBNESS: 0
PARESTHESIAS: 0
HEADACHES: 0
FEVER: 0
LEG PAIN: 0
BOWEL INCONTINENCE: 0

## 2024-05-29 ENCOUNTER — ROUTINE PRENATAL (OUTPATIENT)
Dept: OBSTETRICS AND GYNECOLOGY | Facility: HOSPITAL | Age: 19
End: 2024-05-29
Payer: COMMERCIAL

## 2024-05-29 ENCOUNTER — PROCEDURE VISIT (OUTPATIENT)
Dept: OBSTETRICS AND GYNECOLOGY | Facility: HOSPITAL | Age: 19
End: 2024-05-29
Payer: COMMERCIAL

## 2024-05-29 VITALS — DIASTOLIC BLOOD PRESSURE: 85 MMHG | BODY MASS INDEX: 34.16 KG/M2 | SYSTOLIC BLOOD PRESSURE: 132 MMHG | WEIGHT: 199 LBS

## 2024-05-29 DIAGNOSIS — Z34.00 SUPERVISION OF NORMAL FIRST PREGNANCY, ANTEPARTUM (HHS-HCC): ICD-10-CM

## 2024-05-29 DIAGNOSIS — O36.8390: ICD-10-CM

## 2024-05-29 DIAGNOSIS — M54.6 ACUTE LEFT-SIDED THORACIC BACK PAIN: ICD-10-CM

## 2024-05-29 DIAGNOSIS — Z3A.32 32 WEEKS GESTATION OF PREGNANCY (HHS-HCC): ICD-10-CM

## 2024-05-29 DIAGNOSIS — D50.9 IRON DEFICIENCY ANEMIA, UNSPECIFIED IRON DEFICIENCY ANEMIA TYPE: Primary | ICD-10-CM

## 2024-05-29 PROCEDURE — 99214 OFFICE O/P EST MOD 30 MIN: CPT | Mod: TH | Performed by: ADVANCED PRACTICE MIDWIFE

## 2024-05-29 PROCEDURE — 99214 OFFICE O/P EST MOD 30 MIN: CPT | Performed by: ADVANCED PRACTICE MIDWIFE

## 2024-05-29 PROCEDURE — 59025 FETAL NON-STRESS TEST: CPT | Performed by: ADVANCED PRACTICE MIDWIFE

## 2024-05-29 RX ORDER — CYCLOBENZAPRINE HCL 10 MG
5 TABLET ORAL DAILY
Qty: 3 TABLET | Refills: 0 | Status: SHIPPED | OUTPATIENT
Start: 2024-05-29 | End: 2024-06-04

## 2024-05-29 NOTE — PROGRESS NOTES
Subjective     Kaylyn Payton is a 18 y.o.  at 32w5d with a working estimated date of delivery of 2024, by Ultrasound who presents for a routine prenatal visit.     She denies vaginal bleeding, leakage of fluid, decreased fetal movements, or contractions.    C/o upper back pain - left side, constant, stabbing 7/10 - started 2 days ago, denies any specific injury to back, no falls  Heat makes it worse   Tylenol with minimal relief     Anemia - started taking iron pills     Objective   Physical Exam:   Weight: 90.3 kg (199 lb)  Expected Total Weight Gain: 5 kg (11 lb)-9 kg (19 lb)   Pregravid BMI: 32.08  BP: 132/85  Fetal Heart Rate: 175 Fundal Height (cm): 33 cm Presentation: Vertex           Postpartum Depression: Medium Risk (3/11/2024)    [place]  Depression Scale     Last EPDS Total Score: 9     Last EPDS Self Harm Result: Never   Thoracic region of back - left sided tightness to back extensors muscles and spinalis thoracis, mild tenderness on palpation     Problem List Items Addressed This Visit       Supervision of normal first pregnancy, antepartum ([facility])    Overview     Desired provider in labor: [x] CNM  [] Physician  [x] Initial BMI: 33.13  [x] Prenatal Labs: WNL except rubella non immune  [x] Rh status: A+  [x] Genetic Screening: rr cf DNA   [x] Baby ASA: prescribed 1/15  [x] Dating confirmation with US done at [facility]     [x] Anatomy US: WNL   [x] Fetal Sex: female  [x] Patient added to BirthTracks  [x] 1hr GCT at 24-28wks: WNL at 25w    [x] Tdap (27-36wks):  [x] Flu Shot: completed 23    [] Breastfeeding  [] Pain management during labor:   [x] Postpartum Birth control method:  Mirena IUD immediate PP  [] GBS at 36 wks:  [] Labor support:         Iron deficiency anemia - Primary    Overview     Lab Results   Component Value Date    WBC 10.4 2024    HGB 10.4 (L) 2024    HCT 33.2 (L) 2024    MCV 94 2024     2024   4/9: Started on iron            Relevant Orders    CBC Anemia Panel With Reflex,Pregnancy    Ferritin    Reticulocytes    Antepartum fetal tachycardia affecting care of mother ([facility])    Relevant Orders    Fetal nonstress test, once    Acute left-sided thoracic back pain    Relevant Medications    cyclobenzaprine (Flexeril) 10 mg tablet    Other Relevant Orders    Referral to Chiropractic Medicine - (External)    Referral to Physical Therapy    32 weeks gestation of pregnancy ([facility])     Anemia - recheck labs  Reviewed relief measures for back pain - advised ice, stretching, massage, and PT/chiropractor referral, flexeril rx sent, reviewed precautions   NST for fetal tachycardia - reactive  Reviewed s/sx of PTL, warning signs, fetal movement counts, and when to call provider  Follow up in 2 week for a routine prenatal visit.      Marie Perry, CHUNG-CNM, CHUNG-CNP    Answers submitted by the patient for this visit:  Back Pain Questionnaire (Submitted on 5/28/2024)  Chief Complaint: Back pain  Chronicity: new  Onset: in the past 7 days  Frequency: 2 to 4 times per day  Progression since onset: gradually worsening  Pain location: lumbar spine, thoracic spine  Pain quality: aching, burning, cramping, stabbing  Radiates to: does not radiate  Pain - numeric: 8/10  Pain is: the same all the time  Aggravated by: bending, sitting, standing  Stiffness is present: at night  abdominal pain: No  bladder incontinence: No  bowel incontinence: No  chest pain: No  dysuria: No  fever: No  headaches: No  leg pain: No  numbness: No  paresis: No  paresthesias: No  pelvic pain: No  perianal numbness: No  tingling: No  weakness: No  weight loss: No  Risk factors: pregnancy

## 2024-05-30 PROBLEM — O36.8390: Status: ACTIVE | Noted: 2024-05-30

## 2024-05-30 PROBLEM — M54.6 ACUTE LEFT-SIDED THORACIC BACK PAIN: Status: ACTIVE | Noted: 2024-05-30

## 2024-05-30 PROBLEM — Z3A.32 32 WEEKS GESTATION OF PREGNANCY (HHS-HCC): Status: ACTIVE | Noted: 2024-05-30

## 2024-05-30 NOTE — PROCEDURES
Kaylyn Payton, a  at 32w6d with an ADRIAN of 2024, by Ultrasound, was seen at [facility] for a nonstress test.    Non-Stress Test   Baseline Fetal Heart Rate for Non-Stress Test: 150 BPM  Accelerations in Non-Stress Test: Yes, greater than/equal to 15 bpm, lasting at least 15 seconds  Decelerations in Non-Stress Test: None  Contractions in Non-Stress Test: Not present  Interpretation of Non-Stress Test   Interpretation of Non-Stress Test: Reactive                     Marie Perry, APRN-CNM, APRN-CNP

## 2024-06-03 ENCOUNTER — APPOINTMENT (OUTPATIENT)
Dept: OBSTETRICS AND GYNECOLOGY | Facility: CLINIC | Age: 19
End: 2024-06-03
Payer: COMMERCIAL

## 2024-06-12 ENCOUNTER — PROCEDURE VISIT (OUTPATIENT)
Dept: OBSTETRICS AND GYNECOLOGY | Facility: HOSPITAL | Age: 19
End: 2024-06-12
Payer: COMMERCIAL

## 2024-06-12 ENCOUNTER — LAB (OUTPATIENT)
Dept: LAB | Facility: LAB | Age: 19
End: 2024-06-12
Payer: COMMERCIAL

## 2024-06-12 ENCOUNTER — ROUTINE PRENATAL (OUTPATIENT)
Dept: OBSTETRICS AND GYNECOLOGY | Facility: HOSPITAL | Age: 19
End: 2024-06-12
Payer: COMMERCIAL

## 2024-06-12 VITALS — BODY MASS INDEX: 35.53 KG/M2 | WEIGHT: 207 LBS | SYSTOLIC BLOOD PRESSURE: 114 MMHG | DIASTOLIC BLOOD PRESSURE: 80 MMHG

## 2024-06-12 DIAGNOSIS — R55 SYNCOPE, UNSPECIFIED SYNCOPE TYPE: ICD-10-CM

## 2024-06-12 DIAGNOSIS — R03.0 ELEVATED BP WITHOUT DIAGNOSIS OF HYPERTENSION: ICD-10-CM

## 2024-06-12 DIAGNOSIS — Z3A.32 32 WEEKS GESTATION OF PREGNANCY (HHS-HCC): ICD-10-CM

## 2024-06-12 DIAGNOSIS — Z34.00 SUPERVISION OF NORMAL FIRST PREGNANCY, ANTEPARTUM (HHS-HCC): ICD-10-CM

## 2024-06-12 DIAGNOSIS — D50.9 IRON DEFICIENCY ANEMIA, UNSPECIFIED IRON DEFICIENCY ANEMIA TYPE: ICD-10-CM

## 2024-06-12 DIAGNOSIS — O36.8131 DECREASED FETAL MOVEMENTS IN THIRD TRIMESTER, FETUS 1 OF MULTIPLE GESTATION (HHS-HCC): Primary | ICD-10-CM

## 2024-06-12 LAB
ERYTHROCYTE [DISTWIDTH] IN BLOOD BY AUTOMATED COUNT: 13.3 % (ref 11.5–14.5)
FERRITIN SERPL-MCNC: 22 NG/ML
FERRITIN SERPL-MCNC: 22 NG/ML (ref 8–150)
FOLATE SERPL-MCNC: 15.3 NG/ML
HCT VFR BLD AUTO: 30.8 % (ref 36–46)
HGB BLD-MCNC: 9.5 G/DL (ref 12–16)
HGB RETIC QN: 24 PG (ref 28–38)
IMMATURE RETIC FRACTION: 33.5 %
IRON SATN MFR SERPL: 11 %
IRON SERPL-MCNC: 55 UG/DL
MCH RBC QN AUTO: 27.3 PG (ref 26–34)
MCHC RBC AUTO-ENTMCNC: 30.8 G/DL (ref 32–36)
MCV RBC AUTO: 89 FL (ref 80–100)
NRBC BLD-RTO: 0.5 /100 WBCS (ref 0–0)
PLATELET # BLD AUTO: 325 X10*3/UL (ref 150–450)
RBC # BLD AUTO: 3.48 X10*6/UL (ref 4–5.2)
REFLEX ADDED, ANEMIA PANEL: NORMAL
RETICS #: 0.08 X10*6/UL (ref 0.02–0.08)
RETICS/RBC NFR AUTO: 2.2 % (ref 0.5–2)
TIBC SERPL-MCNC: 496 UG/DL
UIBC SERPL-MCNC: 441 UG/DL
VIT B12 SERPL-MCNC: 166 PG/ML
WBC # BLD AUTO: 7.6 X10*3/UL (ref 4.4–11.3)

## 2024-06-12 PROCEDURE — 85045 AUTOMATED RETICULOCYTE COUNT: CPT

## 2024-06-12 PROCEDURE — 85027 COMPLETE CBC AUTOMATED: CPT

## 2024-06-12 PROCEDURE — 99214 OFFICE O/P EST MOD 30 MIN: CPT | Performed by: ADVANCED PRACTICE MIDWIFE

## 2024-06-12 PROCEDURE — 82746 ASSAY OF FOLIC ACID SERUM: CPT

## 2024-06-12 PROCEDURE — 59025 FETAL NON-STRESS TEST: CPT | Performed by: ADVANCED PRACTICE MIDWIFE

## 2024-06-12 PROCEDURE — 82728 ASSAY OF FERRITIN: CPT

## 2024-06-12 PROCEDURE — 83550 IRON BINDING TEST: CPT

## 2024-06-12 PROCEDURE — 99214 OFFICE O/P EST MOD 30 MIN: CPT | Mod: TH | Performed by: ADVANCED PRACTICE MIDWIFE

## 2024-06-12 PROCEDURE — 82607 VITAMIN B-12: CPT

## 2024-06-12 PROCEDURE — 36415 COLL VENOUS BLD VENIPUNCTURE: CPT

## 2024-06-12 NOTE — PROGRESS NOTES
"Subjective     Kaylyn Payton is a 18 y.o.  at 34w5d with a working estimated date of delivery of 2024, by Ultrasound who presents for a routine prenatal visit.     She denies vaginal bleeding, leakage of fluid, decreased fetal movements, or contractions.    Reports decreased fetal movement, states baby's movement has \"slowed\"    Objective   Physical Exam:   Weight: 93.9 kg (207 lb)  Expected Total Weight Gain: 5 kg (11 lb)-9 kg (19 lb)   Pregravid BMI: 32.08  BP: 114/80  Fetal Heart Rate: 140 Fundal Height (cm): 35 cm Presentation: Vertex           Postpartum Depression: Medium Risk (3/11/2024)    [place]  Depression Scale     Last EPDS Total Score: 9     Last EPDS Self Harm Result: Never        Problem List Items Addressed This Visit       Syncope    Overview     Encouraged high protein and increasing water intake          Supervision of normal first pregnancy, antepartum ([facility])    Overview     Desired provider in labor: [x] CNM  [] Physician  [x] Initial BMI: 33.13  [x] Prenatal Labs: WNL except rubella non immune  [x] Rh status: A+  [x] Genetic Screening: rr cf DNA   [x] Baby ASA: prescribed 1/15  [x] Dating confirmation with US done at [facility]     [x] Anatomy US: WNL   [x] Fetal Sex: female  [x] Patient added to BirthTracks  [x] 1hr GCT at 24-28wks: WNL at 25w    [x] Tdap (27-36wks):  [x] Flu Shot: completed 23    [x] Breastfeeding - pump ordered   [x] Pain management during labor: planning epidural   [x] Postpartum Birth control method:  Mirena IUD immediate PP  [] GBS at 36 wks:  [] Labor support:         Elevated BP without diagnosis of hypertension    Overview     3/11: 143/88, patient left before repeat was possible         32 weeks gestation of pregnancy ([facility])     Other Visit Diagnoses       Decreased fetal movements in third trimester, fetus 1 of multiple gestation ([facility])    -  Primary    Relevant Orders    Fetal nonstress test, once          Anemia - recheck labs, did " not have done after last visit   NST for dec FM -->reactive   Reviewed s/sx of PTL, warning signs, fetal movement counts, and when to call provider  Follow up in 1 week for a routine prenatal visit.      Marie Perry, CHUNG-KATELYN, APRN-CNP

## 2024-06-12 NOTE — PROGRESS NOTES
"Subjective     Kaylyn Payton is a 18 y.o.  at 34w5d with a working estimated date of delivery of 2024, by Ultrasound who presents for a routine prenatal visit.     She denies vaginal bleeding, leakage of fluid, decreased fetal movements, or contractions.    Reports decreased fetal movement, states baby's movement has \"slowed\"    Objective   Physical Exam:   Weight: 93.9 kg (207 lb)  Expected Total Weight Gain: 5 kg (11 lb)-9 kg (19 lb)   Pregravid BMI: 32.08  BP: 114/80                  Postpartum Depression: Medium Risk (3/11/2024)    [place]  Depression Scale     Last EPDS Total Score: 9     Last EPDS Self Harm Result: Never        Problem List Items Addressed This Visit       Syncope    Overview     Encouraged high protein and increasing water intake          Supervision of normal first pregnancy, antepartum ([facility])    Overview     Desired provider in labor: [x] CNM  [] Physician  [x] Initial BMI: 33.13  [x] Prenatal Labs: WNL except rubella non immune  [x] Rh status: A+  [x] Genetic Screening: rr cf DNA   [x] Baby ASA: prescribed 1/15  [x] Dating confirmation with US done at [facility]     [x] Anatomy US: WNL   [x] Fetal Sex: female  [x] Patient added to BirthTracks  [x] 1hr GCT at 24-28wks: WNL at 25w    [x] Tdap (27-36wks):  [x] Flu Shot: completed 23    [] Breastfeeding  [] Pain management during labor:   [x] Postpartum Birth control method:  Mirena IUD immediate PP  [] GBS at 36 wks:  [] Labor support:         Elevated BP without diagnosis of hypertension    Overview     3/11: 143/88, patient left before repeat was possible         32 weeks gestation of pregnancy ([facility])       {Third trimester plan:48141}  Reviewed s/sx of {PTL/labor:92228}, warning signs, fetal movement counts, and when to call provider  Follow up in *** week for a routine prenatal visit.  ***next visit:     Marie Perry, CHUNG-CNSHAUN, APRN-CNP    "

## 2024-06-12 NOTE — PROCEDURES
Kaylyn Payton, a  at 34w5d with an ADRIAN of 2024, by Ultrasound, was seen at [facility] for a nonstress test.    Non-Stress Test   Baseline Fetal Heart Rate for Non-Stress Test: 140 BPM  Variability in Waveform for Non-Stress Test: Moderate  Accelerations in Non-Stress Test: greater than/equal to 15 bpm, lasting at least 15 seconds  Decelerations in Non-Stress Test: None  Contractions in Non-Stress Test: Not present  Acoustic Stimulator for Non-Stress Test: No  Interpretation of Non-Stress Test   Interpretation of Non-Stress Test: Reactive            Marie Perry, APRN-CNM, APRN-CNP

## 2024-06-13 DIAGNOSIS — D50.8 IRON DEFICIENCY ANEMIA SECONDARY TO INADEQUATE DIETARY IRON INTAKE: Primary | ICD-10-CM

## 2024-06-13 RX ORDER — EPINEPHRINE 0.3 MG/.3ML
0.3 INJECTION SUBCUTANEOUS EVERY 5 MIN PRN
OUTPATIENT
Start: 2024-06-13

## 2024-06-13 RX ORDER — DIPHENHYDRAMINE HYDROCHLORIDE 50 MG/ML
50 INJECTION INTRAMUSCULAR; INTRAVENOUS AS NEEDED
OUTPATIENT
Start: 2024-06-13

## 2024-06-13 RX ORDER — FAMOTIDINE 10 MG/ML
20 INJECTION INTRAVENOUS ONCE AS NEEDED
OUTPATIENT
Start: 2024-06-13

## 2024-06-13 RX ORDER — ALBUTEROL SULFATE 0.83 MG/ML
3 SOLUTION RESPIRATORY (INHALATION) AS NEEDED
OUTPATIENT
Start: 2024-06-13

## 2024-06-14 ENCOUNTER — HOSPITAL ENCOUNTER (OUTPATIENT)
Facility: HOSPITAL | Age: 19
Discharge: HOME | End: 2024-06-14
Attending: OBSTETRICS & GYNECOLOGY | Admitting: ADVANCED PRACTICE MIDWIFE
Payer: COMMERCIAL

## 2024-06-14 VITALS
RESPIRATION RATE: 18 BRPM | HEIGHT: 64 IN | SYSTOLIC BLOOD PRESSURE: 126 MMHG | OXYGEN SATURATION: 100 % | DIASTOLIC BLOOD PRESSURE: 79 MMHG | TEMPERATURE: 97.9 F | BODY MASS INDEX: 35.64 KG/M2 | HEART RATE: 107 BPM | WEIGHT: 208.78 LBS

## 2024-06-14 DIAGNOSIS — R51.9 PREGNANCY HEADACHE IN THIRD TRIMESTER (HHS-HCC): ICD-10-CM

## 2024-06-14 DIAGNOSIS — O26.893 PREGNANCY HEADACHE IN THIRD TRIMESTER (HHS-HCC): ICD-10-CM

## 2024-06-14 LAB
BILIRUBIN, POC: NEGATIVE
CLARITY, POC: CLEAR
COLOR, POC: YELLOW
GLUCOSE URINE, POC: NEGATIVE
KETONES, POC: NEGATIVE
LEUKOCYTE EST, POC: NEGATIVE
NITRITE, POC: NEGATIVE
PH, POC: 6
SPECIFIC GRAVITY, POC: 1.03
URINE PROTEIN, POC: NEGATIVE
UROBILINOGEN, POC: 1

## 2024-06-14 PROCEDURE — 59025 FETAL NON-STRESS TEST: CPT

## 2024-06-14 PROCEDURE — 99214 OFFICE O/P EST MOD 30 MIN: CPT

## 2024-06-14 PROCEDURE — 99213 OFFICE O/P EST LOW 20 MIN: CPT | Mod: 25

## 2024-06-14 PROCEDURE — 81002 URINALYSIS NONAUTO W/O SCOPE: CPT

## 2024-06-14 PROCEDURE — 2500000001 HC RX 250 WO HCPCS SELF ADMINISTERED DRUGS (ALT 637 FOR MEDICARE OP)

## 2024-06-14 RX ORDER — SODIUM CHLORIDE 9 MG/ML
100 INJECTION, SOLUTION INTRAVENOUS CONTINUOUS
Status: DISCONTINUED | OUTPATIENT
Start: 2024-06-14 | End: 2024-06-14 | Stop reason: HOSPADM

## 2024-06-14 RX ORDER — SODIUM CHLORIDE 9 MG/ML
3 INJECTION, SOLUTION INTRAMUSCULAR; INTRAVENOUS; SUBCUTANEOUS AS NEEDED
Status: DISCONTINUED | OUTPATIENT
Start: 2024-06-14 | End: 2024-06-14 | Stop reason: HOSPADM

## 2024-06-14 RX ORDER — METOCLOPRAMIDE 10 MG/1
10 TABLET ORAL ONCE
Status: DISCONTINUED | OUTPATIENT
Start: 2024-06-14 | End: 2024-06-14

## 2024-06-14 RX ORDER — DIPHENHYDRAMINE HCL 25 MG
25 CAPSULE ORAL ONCE
Status: COMPLETED | OUTPATIENT
Start: 2024-06-14 | End: 2024-06-14

## 2024-06-14 RX ORDER — ONDANSETRON HYDROCHLORIDE 2 MG/ML
4 INJECTION, SOLUTION INTRAVENOUS EVERY 6 HOURS PRN
Status: DISCONTINUED | OUTPATIENT
Start: 2024-06-14 | End: 2024-06-14 | Stop reason: HOSPADM

## 2024-06-14 RX ORDER — DIPHENHYDRAMINE HYDROCHLORIDE 50 MG/ML
25 INJECTION INTRAMUSCULAR; INTRAVENOUS ONCE
Status: COMPLETED | OUTPATIENT
Start: 2024-06-14 | End: 2024-06-14

## 2024-06-14 RX ORDER — DIPHENHYDRAMINE HCL 25 MG
25 TABLET ORAL AS NEEDED
Qty: 30 TABLET | Refills: 0 | Status: SHIPPED | OUTPATIENT
Start: 2024-06-14

## 2024-06-14 RX ORDER — SUMATRIPTAN 50 MG/1
50 TABLET, FILM COATED ORAL ONCE
Status: COMPLETED | OUTPATIENT
Start: 2024-06-14 | End: 2024-06-14

## 2024-06-14 RX ORDER — METOCLOPRAMIDE HYDROCHLORIDE 5 MG/ML
10 INJECTION INTRAMUSCULAR; INTRAVENOUS ONCE
Status: DISCONTINUED | OUTPATIENT
Start: 2024-06-14 | End: 2024-06-14

## 2024-06-14 RX ORDER — ACETAMINOPHEN 325 MG/1
975 TABLET ORAL ONCE
Status: COMPLETED | OUTPATIENT
Start: 2024-06-14 | End: 2024-06-14

## 2024-06-14 RX ORDER — ACETAMINOPHEN 500 MG
1000 TABLET ORAL EVERY 6 HOURS PRN
Qty: 30 TABLET | Refills: 2 | Status: SHIPPED | OUTPATIENT
Start: 2024-06-14

## 2024-06-14 RX ORDER — ONDANSETRON 4 MG/1
4 TABLET, FILM COATED ORAL EVERY 6 HOURS PRN
Status: DISCONTINUED | OUTPATIENT
Start: 2024-06-14 | End: 2024-06-14 | Stop reason: HOSPADM

## 2024-06-14 SDOH — HEALTH STABILITY: MENTAL HEALTH: HAVE YOU USED ANY SUBSTANCES (CANABIS, COCAINE, HEROIN, HALLUCINOGENS, INHALANTS, ETC.) IN THE PAST 12 MONTHS?: NO

## 2024-06-14 SDOH — SOCIAL STABILITY: SOCIAL INSECURITY: VERBAL ABUSE: DENIES

## 2024-06-14 SDOH — HEALTH STABILITY: MENTAL HEALTH: WERE YOU ABLE TO COMPLETE ALL THE BEHAVIORAL HEALTH SCREENINGS?: YES

## 2024-06-14 SDOH — HEALTH STABILITY: MENTAL HEALTH: NON-SPECIFIC ACTIVE SUICIDAL THOUGHTS (PAST 1 MONTH): NO

## 2024-06-14 SDOH — SOCIAL STABILITY: SOCIAL INSECURITY: ARE YOU OR HAVE YOU BEEN THREATENED OR ABUSED PHYSICALLY, EMOTIONALLY, OR SEXUALLY BY ANYONE?: NO

## 2024-06-14 SDOH — ECONOMIC STABILITY: HOUSING INSECURITY: DO YOU FEEL UNSAFE GOING BACK TO THE PLACE WHERE YOU ARE LIVING?: NO

## 2024-06-14 SDOH — SOCIAL STABILITY: SOCIAL INSECURITY: HAS ANYONE EVER THREATENED TO HURT YOUR FAMILY OR YOUR PETS?: NO

## 2024-06-14 SDOH — SOCIAL STABILITY: SOCIAL INSECURITY: ABUSE SCREEN: ADULT

## 2024-06-14 SDOH — HEALTH STABILITY: MENTAL HEALTH: WISH TO BE DEAD (PAST 1 MONTH): NO

## 2024-06-14 SDOH — SOCIAL STABILITY: SOCIAL INSECURITY: HAVE YOU HAD THOUGHTS OF HARMING ANYONE ELSE?: NO

## 2024-06-14 SDOH — HEALTH STABILITY: MENTAL HEALTH: HAVE YOU USED ANY PRESCRIPTION DRUGS OTHER THAN PRESCRIBED IN THE PAST 12 MONTHS?: NO

## 2024-06-14 SDOH — SOCIAL STABILITY: SOCIAL INSECURITY: DO YOU FEEL ANYONE HAS EXPLOITED OR TAKEN ADVANTAGE OF YOU FINANCIALLY OR OF YOUR PERSONAL PROPERTY?: NO

## 2024-06-14 SDOH — HEALTH STABILITY: MENTAL HEALTH: SUICIDAL BEHAVIOR (LIFETIME): NO

## 2024-06-14 SDOH — SOCIAL STABILITY: SOCIAL INSECURITY: ARE THERE ANY APPARENT SIGNS OF INJURIES/BEHAVIORS THAT COULD BE RELATED TO ABUSE/NEGLECT?: NO

## 2024-06-14 SDOH — SOCIAL STABILITY: SOCIAL INSECURITY: PHYSICAL ABUSE: DENIES

## 2024-06-14 SDOH — SOCIAL STABILITY: SOCIAL INSECURITY: DOES ANYONE TRY TO KEEP YOU FROM HAVING/CONTACTING OTHER FRIENDS OR DOING THINGS OUTSIDE YOUR HOME?: NO

## 2024-06-14 ASSESSMENT — LIFESTYLE VARIABLES
HOW OFTEN DO YOU HAVE A DRINK CONTAINING ALCOHOL: NEVER
AUDIT-C TOTAL SCORE: 0
HOW MANY STANDARD DRINKS CONTAINING ALCOHOL DO YOU HAVE ON A TYPICAL DAY: PATIENT DOES NOT DRINK
AUDIT-C TOTAL SCORE: 0
SKIP TO QUESTIONS 9-10: 1
HOW OFTEN DO YOU HAVE 6 OR MORE DRINKS ON ONE OCCASION: NEVER

## 2024-06-14 ASSESSMENT — PATIENT HEALTH QUESTIONNAIRE - PHQ9
SUM OF ALL RESPONSES TO PHQ9 QUESTIONS 1 & 2: 0
1. LITTLE INTEREST OR PLEASURE IN DOING THINGS: NOT AT ALL
2. FEELING DOWN, DEPRESSED OR HOPELESS: NOT AT ALL

## 2024-06-14 ASSESSMENT — PAIN SCALES - GENERAL
PAINLEVEL_OUTOF10: 4
PAINLEVEL_OUTOF10: 6

## 2024-06-14 NOTE — H&P
Obstetrical Triage History and Physical     Kaylyn Payton is a 18 y.o.  at 35w0d     Chief Complaint: Headache (X3 days)    Assessment/Plan    Headache  - x3 days  - S.030  - Treated with tylenol, benadryl, imitrex, pt with good improvement  - Blood pressure normotensive  - Discussed increasing PO hydration  - Script sent for tylenol  - Return precautions given    Decreased Fetal Movement  - Feeling movement in triage  - NST reactive  - Reassurance given    IUP at 35w0d   - NST reactive  - Good fetal movement  - Continue routine prenatal care  - Next appt   - Precautions to return discussed     Maternal Well-being  - Vital signs stable and WNL  - All questions and concerns addressed     Plan and tracing reviewed with Dr. Garza .  Patient safe and stable for d/c home.    ERIN Hallman      Active Problems:  There are no active Hospital Problems.      Pregnancy Problems (from 23 to present)       Problem Noted Resolved    32 weeks gestation of pregnancy ([facility]) 2024 by PHONG Peacock APRN-CNP No    Priority:  Medium      Iron deficiency anemia 2024 by PHONG Gutiérrez No    Priority:  Medium      Overview Signed 2024  8:00 AM by PHONG Gutiérrez     Lab Results   Component Value Date    WBC 10.4 2024    HGB 10.4 (L) 2024    HCT 33.2 (L) 2024    MCV 94 2024     2024: Started on iron           Elevated BP without diagnosis of hypertension 3/11/2024 by PHONG Gutiérrez No    Priority:  Medium      Overview Addendum 3/11/2024 11:20 AM by PHONG Gutiérrez     3/11: 143/88, patient left before repeat was possible         Syncope 2024 by PHONG Gutiérrez No    Priority:  Medium      Overview Signed 2024 10:13 AM by PHONG Gutiérrez     Encouraged high protein and increasing water intake          Supervision of normal first  pregnancy, antepartum ([facility]) 2023 by PHONG Hein No    Priority:  Medium      Overview Addendum 2024 12:20 PM by PHONG Peacock, APRN-CNP     Desired provider in labor: [x] CNM  [] Physician  [x] Initial BMI: 33.13  [x] Prenatal Labs: WNL except rubella non immune  [x] Rh status: A+  [x] Genetic Screening: rr cf DNA   [x] Baby ASA: prescribed 1/15  [x] Dating confirmation with US done at [facility]     [x] Anatomy US: WNL   [x] Fetal Sex: female  [x] Patient added to BirthTracks  [x] 1hr GCT at 24-28wks: WNL at 25w    [x] Tdap (27-36wks):  [x] Flu Shot: completed 23    [x] Breastfeeding - pump ordered   [x] Pain management during labor: planning epidural   [x] Postpartum Birth control method:  Mirena IUD immediate PP  [] GBS at 36 wks:  [] Labor support:         Initial obstetric visit in first trimester ([facility]) 2023 by PHONG Hein 2024 by PHONG Gutiérrez    10 weeks gestation of pregnancy ([facility]) 2023 by PHONG Hein 2024 by PHONG Gutiérrez          Hanna Patiño is here for a headache for 3 days.  States she has taken tylenol with no relief.  Reports some pressure over left eye.  Denies CP, SOB, or RUQ pain.  Denies ctx, VB, LOF.     Obstetrical History   OB History    Para Term  AB Living   2       1     SAB IAB Ectopic Multiple Live Births   1              # Outcome Date GA Lbr Jono/2nd Weight Sex Delivery Anes PTL Lv   2 Current            1 SAB                Past Medical History  Past Medical History:   Diagnosis Date    ADHD     Asthma ([facility])         Past Surgical History   No past surgical history on file.    Social History  Social History     Tobacco Use    Smoking status: Never    Smokeless tobacco: Never   Substance Use Topics    Alcohol use: Not on file     Substance and Sexual Activity   Drug Use Never       Allergies  Orange juice      Medications  Medications Prior to Admission   Medication Sig Dispense Refill Last Dose    amphetamine-dextroamphetamine XR (Adderall XR) 20 mg 24 hr capsule Take 1 capsule (20 mg) by mouth once daily in the morning. Do not crush or chew.       cyclobenzaprine (Flexeril) 10 mg tablet Take 0.5 tablets (5 mg) by mouth once daily for 6 days. 3 tablet 0     docusate sodium (Colace) 100 mg capsule Take 1 capsule (100 mg) by mouth as needed at bedtime for constipation. 30 capsule 3     ferrous gluconate (Fergon) 240 (27 Fe) MG tablet Take 2 tablets (480 mg) by mouth every other day. One pill twice a day every other day 60 tablet 3     [id number]-dha-epa-fish oil 400 mcg-35 mg -25 mg-5 mg tablet,chewable Chew 400 mcg once daily. 1 tablet 11        Objective    Last Vitals  Temp Pulse Resp BP MAP O2 Sat   36.6 °C (97.9 °F) 107 18 126/79   100 %     Physical Examination  Physical Exam  Exam conducted with a chaperone present.   Constitutional:       Appearance: Normal appearance.   HENT:      Head: Normocephalic.   Cardiovascular:      Rate and Rhythm: Normal rate.   Pulmonary:      Effort: Pulmonary effort is normal.   Abdominal:      Comments: Gravid   Genitourinary:     Comments: FHT: 135, mod variability, +accels, -decels  [place]: irregular ctx noted  Musculoskeletal:         General: Normal range of motion.   Skin:     General: Skin is warm.   Neurological:      Mental Status: She is alert and oriented to person, place, and time.   Psychiatric:         Mood and Affect: Mood normal.         Behavior: Behavior normal.        Lab Review  Labs in chart were reviewed.      This is a shared visit. I have reviewed the Advanced Practice Provider's encounter note, approve the Advanced Practice Provider's documentation, and provide the following additional information from my personal encounter. In short, patient is a 18 y.o.  at 35w0d who presented with headache and decreased fetal movement. The headache completely  resolved during triage visit, and she was normotensive with no neuro symptoms and normal neuro exam. Her NST was reactive and reassuring, and she was feeling normal fetal movement throughout her triage encounter.     Paulina Garza MD

## 2024-06-17 ENCOUNTER — TELEPHONE (OUTPATIENT)
Dept: OBSTETRICS AND GYNECOLOGY | Facility: HOSPITAL | Age: 19
End: 2024-06-17

## 2024-06-17 ENCOUNTER — APPOINTMENT (OUTPATIENT)
Dept: OBSTETRICS AND GYNECOLOGY | Facility: CLINIC | Age: 19
End: 2024-06-17
Payer: COMMERCIAL

## 2024-06-18 ENCOUNTER — ROUTINE PRENATAL (OUTPATIENT)
Dept: OBSTETRICS AND GYNECOLOGY | Facility: HOSPITAL | Age: 19
End: 2024-06-18
Payer: COMMERCIAL

## 2024-06-18 ENCOUNTER — HOSPITAL ENCOUNTER (OUTPATIENT)
Facility: HOSPITAL | Age: 19
End: 2024-06-18
Admitting: ADVANCED PRACTICE MIDWIFE
Payer: COMMERCIAL

## 2024-06-18 ENCOUNTER — HOSPITAL ENCOUNTER (OUTPATIENT)
Facility: HOSPITAL | Age: 19
Discharge: HOME | End: 2024-06-18
Attending: STUDENT IN AN ORGANIZED HEALTH CARE EDUCATION/TRAINING PROGRAM | Admitting: ADVANCED PRACTICE MIDWIFE
Payer: COMMERCIAL

## 2024-06-18 VITALS
WEIGHT: 205.47 LBS | SYSTOLIC BLOOD PRESSURE: 139 MMHG | OXYGEN SATURATION: 100 % | DIASTOLIC BLOOD PRESSURE: 93 MMHG | RESPIRATION RATE: 16 BRPM | BODY MASS INDEX: 35.08 KG/M2 | TEMPERATURE: 97 F | HEART RATE: 95 BPM | HEIGHT: 64 IN

## 2024-06-18 VITALS — DIASTOLIC BLOOD PRESSURE: 90 MMHG | WEIGHT: 204 LBS | BODY MASS INDEX: 35.02 KG/M2 | SYSTOLIC BLOOD PRESSURE: 127 MMHG

## 2024-06-18 DIAGNOSIS — R51.9 PREGNANCY HEADACHE IN THIRD TRIMESTER (HHS-HCC): ICD-10-CM

## 2024-06-18 DIAGNOSIS — Z3A.34 34 WEEKS GESTATION OF PREGNANCY (HHS-HCC): Primary | ICD-10-CM

## 2024-06-18 DIAGNOSIS — Z34.00 SUPERVISION OF NORMAL FIRST PREGNANCY, ANTEPARTUM (HHS-HCC): ICD-10-CM

## 2024-06-18 DIAGNOSIS — O16.3 ELEVATED BLOOD PRESSURE AFFECTING PREGNANCY IN THIRD TRIMESTER, ANTEPARTUM (HHS-HCC): ICD-10-CM

## 2024-06-18 DIAGNOSIS — O26.893 PREGNANCY HEADACHE IN THIRD TRIMESTER (HHS-HCC): ICD-10-CM

## 2024-06-18 PROBLEM — O13.3 GESTATIONAL HYPERTENSION, THIRD TRIMESTER (HHS-HCC): Status: ACTIVE | Noted: 2024-06-18

## 2024-06-18 LAB
ALBUMIN SERPL BCP-MCNC: 3.4 G/DL (ref 3.4–5)
ALP SERPL-CCNC: 128 U/L (ref 33–110)
ALT SERPL W P-5'-P-CCNC: 13 U/L (ref 7–45)
ANION GAP SERPL CALC-SCNC: 15 MMOL/L (ref 10–20)
AST SERPL W P-5'-P-CCNC: 20 U/L (ref 9–39)
BILIRUB SERPL-MCNC: 0.3 MG/DL (ref 0–1.2)
BUN SERPL-MCNC: 6 MG/DL (ref 6–23)
CALCIUM SERPL-MCNC: 8.8 MG/DL (ref 8.6–10.6)
CHLORIDE SERPL-SCNC: 107 MMOL/L (ref 98–107)
CO2 SERPL-SCNC: 18 MMOL/L (ref 21–32)
CREAT SERPL-MCNC: 0.44 MG/DL (ref 0.5–1.05)
CREAT UR-MCNC: 159.2 MG/DL (ref 20–320)
EGFRCR SERPLBLD CKD-EPI 2021: >90 ML/MIN/1.73M*2
ERYTHROCYTE [DISTWIDTH] IN BLOOD BY AUTOMATED COUNT: 13.7 % (ref 11.5–14.5)
GLUCOSE SERPL-MCNC: 90 MG/DL (ref 74–99)
HCT VFR BLD AUTO: 30.5 % (ref 36–46)
HGB BLD-MCNC: 9.6 G/DL (ref 12–16)
LDH SERPL L TO P-CCNC: 176 U/L (ref 84–246)
MCH RBC QN AUTO: 27.2 PG (ref 26–34)
MCHC RBC AUTO-ENTMCNC: 31.5 G/DL (ref 32–36)
MCV RBC AUTO: 86 FL (ref 80–100)
NRBC BLD-RTO: 0.5 /100 WBCS (ref 0–0)
PLATELET # BLD AUTO: 331 X10*3/UL (ref 150–450)
POC APPEARANCE, URINE: CLEAR
POC BILIRUBIN, URINE: NEGATIVE
POC BLOOD, URINE: NEGATIVE
POC COLOR, URINE: NORMAL
POC GLUCOSE, URINE: NEGATIVE MG/DL
POC KETONES, URINE: NEGATIVE MG/DL
POC LEUKOCYTES, URINE: NEGATIVE
POC NITRITE,URINE: NEGATIVE
POC PH, URINE: 6 PH
POC PROTEIN, URINE: NEGATIVE MG/DL
POC SPECIFIC GRAVITY, URINE: >=1.03
POC UROBILINOGEN, URINE: 0.2 EU/DL
POTASSIUM SERPL-SCNC: 4 MMOL/L (ref 3.5–5.3)
PROT SERPL-MCNC: 6.2 G/DL (ref 6.4–8.2)
PROT UR-ACNC: 21 MG/DL (ref 5–24)
PROT/CREAT UR: 0.13 MG/MG CREAT (ref 0–0.17)
RBC # BLD AUTO: 3.53 X10*6/UL (ref 4–5.2)
SODIUM SERPL-SCNC: 136 MMOL/L (ref 136–145)
WBC # BLD AUTO: 7.9 X10*3/UL (ref 4.4–11.3)

## 2024-06-18 PROCEDURE — 36415 COLL VENOUS BLD VENIPUNCTURE: CPT | Performed by: FAMILY MEDICINE

## 2024-06-18 PROCEDURE — 99213 OFFICE O/P EST LOW 20 MIN: CPT | Performed by: ADVANCED PRACTICE MIDWIFE

## 2024-06-18 PROCEDURE — 99213 OFFICE O/P EST LOW 20 MIN: CPT | Performed by: FAMILY MEDICINE

## 2024-06-18 PROCEDURE — 36415 COLL VENOUS BLD VENIPUNCTURE: CPT

## 2024-06-18 PROCEDURE — 81002 URINALYSIS NONAUTO W/O SCOPE: CPT | Performed by: FAMILY MEDICINE

## 2024-06-18 PROCEDURE — 59025 FETAL NON-STRESS TEST: CPT | Performed by: FAMILY MEDICINE

## 2024-06-18 PROCEDURE — 2500000001 HC RX 250 WO HCPCS SELF ADMINISTERED DRUGS (ALT 637 FOR MEDICARE OP): Performed by: FAMILY MEDICINE

## 2024-06-18 PROCEDURE — 83615 LACTATE (LD) (LDH) ENZYME: CPT | Performed by: FAMILY MEDICINE

## 2024-06-18 PROCEDURE — 82374 ASSAY BLOOD CARBON DIOXIDE: CPT | Performed by: FAMILY MEDICINE

## 2024-06-18 PROCEDURE — 99214 OFFICE O/P EST MOD 30 MIN: CPT | Mod: 25,27

## 2024-06-18 PROCEDURE — 99213 OFFICE O/P EST LOW 20 MIN: CPT | Mod: TH | Performed by: ADVANCED PRACTICE MIDWIFE

## 2024-06-18 PROCEDURE — 82570 ASSAY OF URINE CREATININE: CPT | Performed by: FAMILY MEDICINE

## 2024-06-18 PROCEDURE — 85027 COMPLETE CBC AUTOMATED: CPT | Performed by: FAMILY MEDICINE

## 2024-06-18 RX ORDER — LIDOCAINE HYDROCHLORIDE 10 MG/ML
0.5 INJECTION INFILTRATION; PERINEURAL ONCE AS NEEDED
Status: DISCONTINUED | OUTPATIENT
Start: 2024-06-18 | End: 2024-06-18 | Stop reason: HOSPADM

## 2024-06-18 RX ORDER — METOCLOPRAMIDE 10 MG/1
10 TABLET ORAL ONCE
Status: DISCONTINUED | OUTPATIENT
Start: 2024-06-18 | End: 2024-06-18

## 2024-06-18 RX ORDER — HYDRALAZINE HYDROCHLORIDE 20 MG/ML
5 INJECTION INTRAMUSCULAR; INTRAVENOUS ONCE AS NEEDED
Status: DISCONTINUED | OUTPATIENT
Start: 2024-06-18 | End: 2024-06-18 | Stop reason: HOSPADM

## 2024-06-18 RX ORDER — ACETAMINOPHEN 325 MG/1
975 TABLET ORAL ONCE
Status: COMPLETED | OUTPATIENT
Start: 2024-06-18 | End: 2024-06-18

## 2024-06-18 RX ORDER — NIFEDIPINE 10 MG/1
10 CAPSULE ORAL ONCE AS NEEDED
Status: DISCONTINUED | OUTPATIENT
Start: 2024-06-18 | End: 2024-06-18 | Stop reason: HOSPADM

## 2024-06-18 RX ORDER — DIPHENHYDRAMINE HCL 25 MG
25 CAPSULE ORAL ONCE
Status: COMPLETED | OUTPATIENT
Start: 2024-06-18 | End: 2024-06-18

## 2024-06-18 RX ADMIN — ACETAMINOPHEN 975 MG: 325 TABLET ORAL at 09:46

## 2024-06-18 RX ADMIN — DIPHENHYDRAMINE HYDROCHLORIDE 25 MG: 25 CAPSULE ORAL at 09:46

## 2024-06-18 SDOH — HEALTH STABILITY: MENTAL HEALTH: WISH TO BE DEAD (PAST 1 MONTH): NO

## 2024-06-18 SDOH — HEALTH STABILITY: MENTAL HEALTH: HAVE YOU USED ANY PRESCRIPTION DRUGS OTHER THAN PRESCRIBED IN THE PAST 12 MONTHS?: NO

## 2024-06-18 SDOH — SOCIAL STABILITY: SOCIAL INSECURITY: HAS ANYONE EVER THREATENED TO HURT YOUR FAMILY OR YOUR PETS?: NO

## 2024-06-18 SDOH — HEALTH STABILITY: MENTAL HEALTH: NON-SPECIFIC ACTIVE SUICIDAL THOUGHTS (PAST 1 MONTH): NO

## 2024-06-18 SDOH — SOCIAL STABILITY: SOCIAL INSECURITY: DO YOU FEEL ANYONE HAS EXPLOITED OR TAKEN ADVANTAGE OF YOU FINANCIALLY OR OF YOUR PERSONAL PROPERTY?: NO

## 2024-06-18 SDOH — SOCIAL STABILITY: SOCIAL INSECURITY: ARE YOU OR HAVE YOU BEEN THREATENED OR ABUSED PHYSICALLY, EMOTIONALLY, OR SEXUALLY BY ANYONE?: NO

## 2024-06-18 SDOH — SOCIAL STABILITY: SOCIAL INSECURITY: PHYSICAL ABUSE: DENIES

## 2024-06-18 SDOH — HEALTH STABILITY: MENTAL HEALTH: SUICIDAL BEHAVIOR (LIFETIME): NO

## 2024-06-18 SDOH — SOCIAL STABILITY: SOCIAL INSECURITY: HAVE YOU HAD THOUGHTS OF HARMING ANYONE ELSE?: NO

## 2024-06-18 SDOH — SOCIAL STABILITY: SOCIAL INSECURITY: ARE THERE ANY APPARENT SIGNS OF INJURIES/BEHAVIORS THAT COULD BE RELATED TO ABUSE/NEGLECT?: NO

## 2024-06-18 SDOH — ECONOMIC STABILITY: HOUSING INSECURITY: DO YOU FEEL UNSAFE GOING BACK TO THE PLACE WHERE YOU ARE LIVING?: NO

## 2024-06-18 SDOH — SOCIAL STABILITY: SOCIAL INSECURITY: DOES ANYONE TRY TO KEEP YOU FROM HAVING/CONTACTING OTHER FRIENDS OR DOING THINGS OUTSIDE YOUR HOME?: NO

## 2024-06-18 SDOH — HEALTH STABILITY: MENTAL HEALTH: WERE YOU ABLE TO COMPLETE ALL THE BEHAVIORAL HEALTH SCREENINGS?: YES

## 2024-06-18 SDOH — SOCIAL STABILITY: SOCIAL INSECURITY: VERBAL ABUSE: DENIES

## 2024-06-18 SDOH — SOCIAL STABILITY: SOCIAL INSECURITY: ABUSE SCREEN: ADULT

## 2024-06-18 SDOH — HEALTH STABILITY: MENTAL HEALTH: HAVE YOU USED ANY SUBSTANCES (CANABIS, COCAINE, HEROIN, HALLUCINOGENS, INHALANTS, ETC.) IN THE PAST 12 MONTHS?: NO

## 2024-06-18 ASSESSMENT — LIFESTYLE VARIABLES
SKIP TO QUESTIONS 9-10: 1
AUDIT-C TOTAL SCORE: 0
HOW MANY STANDARD DRINKS CONTAINING ALCOHOL DO YOU HAVE ON A TYPICAL DAY: PATIENT DOES NOT DRINK
HOW OFTEN DO YOU HAVE A DRINK CONTAINING ALCOHOL: NEVER
HOW OFTEN DO YOU HAVE 6 OR MORE DRINKS ON ONE OCCASION: NEVER
AUDIT-C TOTAL SCORE: 0

## 2024-06-18 ASSESSMENT — PATIENT HEALTH QUESTIONNAIRE - PHQ9
SUM OF ALL RESPONSES TO PHQ9 QUESTIONS 1 & 2: 0
2. FEELING DOWN, DEPRESSED OR HOPELESS: NOT AT ALL
1. LITTLE INTEREST OR PLEASURE IN DOING THINGS: NOT AT ALL

## 2024-06-18 ASSESSMENT — PAIN SCALES - GENERAL
PAINLEVEL_OUTOF10: 4
PAINLEVEL_OUTOF10: 3
PAINLEVEL_OUTOF10: 0 - NO PAIN

## 2024-06-18 ASSESSMENT — PAIN DESCRIPTION - DESCRIPTORS: DESCRIPTORS: ACHING

## 2024-06-18 NOTE — H&P
Obstetrical TRIAGE History and Physical     Kaylyn Payton is a 18 y.o. . 35w4d ga dated by LMP c/w 11w5d US. Receives prenatal care with CNMs in [place] [address].      Chief Complaint: Hypertension and Headache    Assessment/Plan      gHTN  - dx by 2 mild range BP readings >4hrs apart (1st 3/11)  - sent from [id number] for mild range BP reading with HA today  - scattered mild ranges on cycled BPs in triage  - HELLP labs negative; P:C 0.13  - 4/10 L sided HA, see below  - BP cuff for home, to check BID  - signs and symptoms of PEC reviewed, BP parameters reviewed  - recommend BP check in clinic in 3 days, message sent to clinic staff to assist in scheduling    Headache  - L sided x7 days  - seen in OB triage  for same sx, given Tylenol/Benadryl with mild improvement per pt  - 4/10 HA -> Tylenol, Benadryl (unable to take Reglan d/t anxiety), PO fluids -> complete resolution  - UA dip: SG >1.030, neg ketones  - encouraged to use Tylenol and increase fluid intake    IUP at 35w4d  - NST reactive  - Good fetal movement  - Continue routine prenatal care  - Next appt     Maternal Well-being  - Vital signs stable  - All questions and concerns addressed    Dispo  - patient appropriate for d/c home, agrees with plan  - f/u at next scheduled OB appt or to triage sooner as needed      Discussed plan and reviewed tracing with Dr. Muriel Null, APRN-CNP        Active Problems:  There are no active Hospital Problems.      Pregnancy Problems (from 23 to present)       Problem Noted Resolved    34 weeks gestation of pregnancy ([facility]) 2024 by Krystal Ho RN No    Priority:  Medium      Pregnancy headache in third trimester ([facility]) 2024 by Krystal Ho RN No    Priority:  Medium      Elevated blood pressure affecting pregnancy in third trimester, antepartum ([facility]) 2024 by Krystal Ho RN No    Priority:  Medium      Overview Signed 2024  8:50 AM by Krystal Ho RN     3/11:  143/88, unable to recheck prior to pt leaving appt  6/18: 136/91 with recheck of 127/90         32 weeks gestation of pregnancy ([facility]) 5/30/2024 by PHONG Peacock APRN-CNP No    Priority:  Medium      Iron deficiency anemia 4/9/2024 by PHONG Gutiérrez No    Priority:  Medium      Overview Signed 4/9/2024  8:00 AM by PHONG Gutiérrez     Lab Results   Component Value Date    WBC 10.4 04/08/2024    HGB 10.4 (L) 04/08/2024    HCT 33.2 (L) 04/08/2024    MCV 94 04/08/2024     04/08/2024 4/9: Started on iron           Elevated BP without diagnosis of hypertension 3/11/2024 by PHONG Gutiérrez No    Priority:  Medium      Overview Addendum 3/11/2024 11:20 AM by PHONG Gutiérrez     3/11: 143/88, patient left before repeat was possible         Syncope 2/12/2024 by PHONG Gutiérrez No    Priority:  Medium      Overview Signed 2/12/2024 10:13 AM by PHONG Gutiérrez     Encouraged high protein and increasing water intake          Supervision of normal first pregnancy, antepartum ([facility]) 12/27/2023 by PHONG Hein No    Priority:  Medium      Overview Addendum 6/12/2024 12:20 PM by PHONG Peacock, APRN-CNP     Desired provider in labor: [x] CNM  [] Physician  [x] Initial BMI: 33.13  [x] Prenatal Labs: WNL except rubella non immune  [x] Rh status: A+  [x] Genetic Screening: rr cf DNA   [x] Baby ASA: prescribed 1/15  [x] Dating confirmation with US done at [facility]     [x] Anatomy US: WNL 2/28  [x] Fetal Sex: female  [x] Patient added to BirthTracks  [x] 1hr GCT at 24-28wks: WNL at 25w    [x] Tdap (27-36wks):  [x] Flu Shot: completed 12/27/23    [x] Breastfeeding - pump ordered   [x] Pain management during labor: planning epidural   [x] Postpartum Birth control method:  Mirena IUD immediate PP  [] GBS at 36 wks:  [] Labor support:         Initial obstetric visit in first trimester ([facility])  2023 by PHONG Hein 2024 by PHONG Gutiérrez    10 weeks gestation of pregnancy ([facility]) 2023 by PHONG Hein 2024 by PHONG Gutiérrez          Hanna Patiño is here for mild range BP readings at prenatal appt in [id number] today, along with HA. Had 1 isolated mild range BP reading at 21w, otherwise normotensive during pregnancy. She reports L sided HA x7 days, seen in OB triage  and given Tylenol/Benadryl with mild improvement in sx. She took Excedrin yesterday which helps some with pain, has not taken medication today. Denies CP, SOB, RUQ pain, edema. No h/o hypertensive disease outside of pregnancy. She does report h/o asthma, no intubations/hospitalizations.    Denies LOF, vag bleeding, decreased fetal movement, ctx, abd pain.         Obstetrical History   OB History    Para Term  AB Living   2       1     SAB IAB Ectopic Multiple Live Births   1              # Outcome Date GA Lbr Jono/2nd Weight Sex Delivery Anes PTL Lv   2 Current            1 SAB                Past Medical History  Past Medical History:   Diagnosis Date    ADHD     Asthma ([facility])         Past Surgical History   No past surgical history on file.    Social History  Social History     Tobacco Use    Smoking status: Never    Smokeless tobacco: Never   Substance Use Topics    Alcohol use: Not on file     Substance and Sexual Activity   Drug Use Never       Allergies  Orange juice     Medications  Medications Prior to Admission   Medication Sig Dispense Refill Last Dose    acetaminophen (Tylenol) 500 mg tablet Take 2 tablets (1,000 mg) by mouth every 6 hours if needed for headaches, mild pain (1 - 3) or moderate pain (4 - 6). 30 tablet 2 Past Month    amphetamine-dextroamphetamine XR (Adderall XR) 20 mg 24 hr capsule Take 1 capsule (20 mg) by mouth once daily in the morning. Do not crush or chew.   Past Week    diphenhydrAMINE (Sominex) 25 mg  tablet Take 1 tablet (25 mg) by mouth if needed (headache). 30 tablet 0 Past Week    ferrous gluconate (Fergon) 240 (27 Fe) MG tablet Take 2 tablets (480 mg) by mouth every other day. One pill twice a day every other day 60 tablet 3 Past Week    [id number]-dha-epa-fish oil 400 mcg-35 mg -25 mg-5 mg tablet,chewable Chew 400 mcg once daily. 1 tablet 11 6/18/2024    cyclobenzaprine (Flexeril) 10 mg tablet Take 0.5 tablets (5 mg) by mouth once daily for 6 days. 3 tablet 0     docusate sodium (Colace) 100 mg capsule Take 1 capsule (100 mg) by mouth as needed at bedtime for constipation. 30 capsule 3 Unknown       Objective    Last Vitals  Temp Pulse Resp BP MAP O2 Sat   36.1 °C (97 °F) (!) 119 16 119/67   98 %       Physical Exam  Constitutional:       Appearance: Normal appearance.   HENT:      Head: Normocephalic.   Cardiovascular:      Rate and Rhythm: Normal rate.   Pulmonary:      Effort: Pulmonary effort is normal.   Abdominal:      Palpations: Abdomen is soft.      Tenderness: There is no abdominal tenderness.   Genitourinary:     Comments: , mod variability, +accels, -decels  TOCO: no ctx  Skin:     General: Skin is warm and dry.   Neurological:      Mental Status: She is alert and oriented to person, place, and time.   Psychiatric:         Mood and Affect: Mood normal.         Behavior: Behavior normal.         Lab Review  Admission on 06/18/2024   Component Date Value Ref Range Status    POC Color, Urine 06/18/2024 Light-Yellow  Straw, Yellow, Light-Yellow Final    POC Appearance, Urine 06/18/2024 Clear  Clear Final    POC Glucose, Urine 06/18/2024 NEGATIVE  NEGATIVE mg/dl Final    POC Bilirubin, Urine 06/18/2024 NEGATIVE  NEGATIVE Final    POC Ketones, Urine 06/18/2024 NEGATIVE  NEGATIVE mg/dl Final    POC Specific Gravity, Urine 06/18/2024 >=1.030  1.005 - 1.035 Final    POC Blood, Urine 06/18/2024 NEGATIVE  NEGATIVE Final    POC PH, Urine 06/18/2024 6.0  No Reference Range Established PH Final     POC Protein, Urine 06/18/2024 NEGATIVE  NEGATIVE, 30 (1+) mg/dl Final    POC Urobilinogen, Urine 06/18/2024 0.2  0.2, 1.0 EU/DL Final    Poc Nitrite, Urine 06/18/2024 NEGATIVE  NEGATIVE Final    POC Leukocytes, Urine 06/18/2024 NEGATIVE  NEGATIVE Final    WBC 06/18/2024 7.9  4.4 - 11.3 x10*3/uL Final    nRBC 06/18/2024 0.5 (H)  0.0 - 0.0 /100 WBCs Final    RBC 06/18/2024 3.53 (L)  4.00 - 5.20 x10*6/uL Final    Hemoglobin 06/18/2024 9.6 (L)  12.0 - 16.0 g/dL Final    Hematocrit 06/18/2024 30.5 (L)  36.0 - 46.0 % Final    MCV 06/18/2024 86  80 - 100 fL Final    MCH 06/18/2024 27.2  26.0 - 34.0 pg Final    MCHC 06/18/2024 31.5 (L)  32.0 - 36.0 g/dL Final    RDW 06/18/2024 13.7  11.5 - 14.5 % Final    Platelets 06/18/2024 331  150 - 450 x10*3/uL Final    Glucose 06/18/2024 90  74 - 99 mg/dL Final    Sodium 06/18/2024 136  136 - 145 mmol/L Final    Potassium 06/18/2024 4.0  3.5 - 5.3 mmol/L Final    Chloride 06/18/2024 107  98 - 107 mmol/L Final    Bicarbonate 06/18/2024 18 (L)  21 - 32 mmol/L Final    Anion Gap 06/18/2024 15  10 - 20 mmol/L Final    Urea Nitrogen 06/18/2024 6  6 - 23 mg/dL Final    Creatinine 06/18/2024 0.44 (L)  0.50 - 1.05 mg/dL Final    eGFR 06/18/2024 >90  >60 mL/min/1.73m*2 Final    Calculations of estimated GFR are performed using the 2021 CKD-EPI Study Refit equation without the race variable for the IDMS-Traceable creatinine methods.  https://jasn.asnjournals.org/content/early/2021/09/22/ASN.9558966138    Calcium 06/18/2024 8.8  8.6 - 10.6 mg/dL Final    Albumin 06/18/2024 3.4  3.4 - 5.0 g/dL Final    Alkaline Phosphatase 06/18/2024 128 (H)  33 - 110 U/L Final    Total Protein 06/18/2024 6.2 (L)  6.4 - 8.2 g/dL Final    AST 06/18/2024 20  9 - 39 U/L Final    Bilirubin, Total 06/18/2024 0.3  0.0 - 1.2 mg/dL Final    ALT 06/18/2024 13  7 - 45 U/L Final    Patients treated with Sulfasalazine may generate falsely decreased results for ALT.    Total Protein, Urine Random 06/18/2024 21  5 - 24 mg/dL  Final    Creatinine, Urine Random 06/18/2024 159.2  20.0 - 320.0 mg/dL Final    T. Protein/Creatinine Ratio 06/18/2024 0.13  0.00 - 0.17 mg/mg Creat Final    LDH 06/18/2024 176  84 - 246 U/L Final

## 2024-06-18 NOTE — PROGRESS NOTES
"Subjective     Kaylyn Payton is a 18 y.o.  at 35w4d with a working estimated date of delivery of 2024, by Ultrasound who presents for a routine prenatal visit.     She denies vaginal bleeding, leakage of fluid, decreased fetal movements, or contractions.    Pt went to L&D triage on 24 for HA x 3 days and decreased FM. No elevated BP in triage, no HELLP labs sent. She was treated with tylenol, benadryl and Imitrex which provided some relief but did not resolve the headache. Per patient, was discharged with 5/10 HA. Today, she reports that she continues to have a HA 2/10 with throbbing pain. Denies sensitivity to light. Occasionally sensitive to sound and endorses \"stars\" in her vision. She reports using Excedrin at home yesterday which helped for a few hours but has not taken anything today. Denies CP, SOB, or RUQ pain. She reports eating regular meals and drinking up to 8, 30oz coreas per day.    BP elevated today at 136/91 with recheck of 127/90. Pt had one previous mild range BP on 3/11/24 at 21 wga.    Objective   Physical Exam:   Weight: 92.5 kg (204 lb)  Expected Total Weight Gain: 5 kg (11 lb)-9 kg (19 lb)   Pregravid BMI: 32.08  BP: 127/90  Fetal Heart Rate: 152 Fundal Height (cm): 35 cm             Postpartum Depression: Medium Risk (3/11/2024)    [place]  Depression Scale     Last EPDS Total Score: 9     Last EPDS Self Harm Result: Never        Problem List Items Addressed This Visit       Supervision of normal first pregnancy, antepartum ([facility])    Overview     Desired provider in labor: [x] CNM  [] Physician  [x] Initial BMI: 33.13  [x] Prenatal Labs: WNL except rubella non immune  [x] Rh status: A+  [x] Genetic Screening: rr cf DNA   [x] Baby ASA: prescribed 1/15  [x] Dating confirmation with US done at [facility] [place], operated by [facility]     [x] Anatomy US: WNL   [x] Fetal Sex: female  [x] Patient added to BirthTracks  [x] 1hr GCT at 24-28wks: WNL at 25w    [x] Tdap (27-36wks):  [x] Flu Shot: completed " 12/27/23    [x] Breastfeeding - pump ordered   [x] Pain management during labor: planning epidural   [x] Postpartum Birth control method:  Mirena IUD immediate PP  [] GBS at 36 wks:  [] Labor support:         35 weeks gestation of pregnancy ([facility]) - Primary    Pregnancy headache in third trimester ([facility])    Elevated blood pressure affecting pregnancy in third trimester, antepartum ([facility])    Overview     3/11: 143/88, unable to recheck prior to pt leaving appt  6/18: 136/91 with recheck of 127/90          -Discussed further evaluation needed in L&D triage for unrelenting HA and mild range BP x 2 in the office today, pt agreeable to plan.   -Reviewed s/sx of PTL, warning signs, fetal movement counts, and when to call provider    next visit: GBS screening    Krystal Ho, RN, FNP Student    Report called to L&D triage RN and DARLINE team  Marie Perry, CHUNG-KATELYN, APRN-CNP    I was present with the APRN student who participated in the documentation of this note. I have personally seen and examined the patient and performed the medical decision-making components. I have reviewed the APRN student documentation and verified the findings in the note as written with additions or exceptions as stated in the body of this note.  Marie DARLING

## 2024-06-20 ENCOUNTER — APPOINTMENT (OUTPATIENT)
Dept: PHYSICAL THERAPY | Facility: CLINIC | Age: 19
End: 2024-06-20
Payer: COMMERCIAL

## 2024-06-20 ENCOUNTER — APPOINTMENT (OUTPATIENT)
Dept: INTEGRATIVE MEDICINE | Facility: CLINIC | Age: 19
End: 2024-06-20
Payer: COMMERCIAL

## 2024-06-21 ENCOUNTER — CLINICAL SUPPORT (OUTPATIENT)
Dept: OBSTETRICS AND GYNECOLOGY | Facility: HOSPITAL | Age: 19
End: 2024-06-21
Payer: COMMERCIAL

## 2024-06-21 VITALS — WEIGHT: 204.4 LBS | DIASTOLIC BLOOD PRESSURE: 86 MMHG | BODY MASS INDEX: 35.09 KG/M2 | SYSTOLIC BLOOD PRESSURE: 127 MMHG

## 2024-06-21 DIAGNOSIS — R55 SYNCOPE, UNSPECIFIED SYNCOPE TYPE: ICD-10-CM

## 2024-06-21 DIAGNOSIS — Z34.00 SUPERVISION OF NORMAL FIRST PREGNANCY, ANTEPARTUM (HHS-HCC): ICD-10-CM

## 2024-06-21 DIAGNOSIS — O13.3 GESTATIONAL HYPERTENSION, THIRD TRIMESTER (HHS-HCC): ICD-10-CM

## 2024-06-21 DIAGNOSIS — Z3A.34 34 WEEKS GESTATION OF PREGNANCY (HHS-HCC): ICD-10-CM

## 2024-06-21 DIAGNOSIS — O26.893 PREGNANCY HEADACHE IN THIRD TRIMESTER (HHS-HCC): ICD-10-CM

## 2024-06-21 DIAGNOSIS — R51.9 PREGNANCY HEADACHE IN THIRD TRIMESTER (HHS-HCC): ICD-10-CM

## 2024-06-21 DIAGNOSIS — O16.3 ELEVATED BLOOD PRESSURE AFFECTING PREGNANCY IN THIRD TRIMESTER, ANTEPARTUM (HHS-HCC): ICD-10-CM

## 2024-06-21 DIAGNOSIS — Z3A.32 32 WEEKS GESTATION OF PREGNANCY (HHS-HCC): ICD-10-CM

## 2024-06-21 DIAGNOSIS — R03.0 ELEVATED BP WITHOUT DIAGNOSIS OF HYPERTENSION: ICD-10-CM

## 2024-06-21 PROCEDURE — 99211 OFF/OP EST MAY X REQ PHY/QHP: CPT | Performed by: OBSTETRICS & GYNECOLOGY

## 2024-06-21 NOTE — PROGRESS NOTES
Patient here for BP check after triage visit on 6/18.   Patient is currently 36 weeks with gHTN, not currently on meds.   BP's at home: all below 140/90.   BP today 139/95, denies headache, lightheadedness, dizziness, blurred vision, or pain in the upper right quadrant.   Discussed patient with Dr. Mccabe, Dr. Mccabe stated if 15 minute recheck is less than 140/90 patient ok to leave without triage visit, but to schedule follow up prenatal visit.   15 minute BP recheck was 127/86.   Patient encouraged to schedule repeat prenatal visit for next week and to continue to check her BP's at homes.  Patient wants to know when she can schedule her induction, let her know she can discuss that with Marie at her next prenatal visit.   Patient got upset and stated she was told on labor and delivery her appointment for today was to schedule her induction.   Discussed with patient that this appointment was a nurse visit for a BP check , she would need to discuss delivery planning at her next visit with Marie.   Patient got upset and left visit.

## 2024-06-25 ENCOUNTER — ROUTINE PRENATAL (OUTPATIENT)
Dept: OBSTETRICS AND GYNECOLOGY | Facility: HOSPITAL | Age: 19
End: 2024-06-25
Payer: COMMERCIAL

## 2024-06-25 VITALS — WEIGHT: 206 LBS | SYSTOLIC BLOOD PRESSURE: 134 MMHG | BODY MASS INDEX: 35.36 KG/M2 | DIASTOLIC BLOOD PRESSURE: 99 MMHG

## 2024-06-25 DIAGNOSIS — Z3A.36 36 WEEKS GESTATION OF PREGNANCY (HHS-HCC): Primary | ICD-10-CM

## 2024-06-25 DIAGNOSIS — O13.3 GESTATIONAL HYPERTENSION, THIRD TRIMESTER (HHS-HCC): ICD-10-CM

## 2024-06-25 DIAGNOSIS — Z87.09 HISTORY OF ASTHMA: ICD-10-CM

## 2024-06-25 DIAGNOSIS — D50.9 IRON DEFICIENCY ANEMIA, UNSPECIFIED IRON DEFICIENCY ANEMIA TYPE: ICD-10-CM

## 2024-06-25 PROCEDURE — 87081 CULTURE SCREEN ONLY: CPT

## 2024-06-25 PROCEDURE — 99212 OFFICE O/P EST SF 10 MIN: CPT

## 2024-06-25 PROCEDURE — 99212 OFFICE O/P EST SF 10 MIN: CPT | Mod: TH

## 2024-06-25 NOTE — PROGRESS NOTES
Subjective   Kaylyn Payton is a 18 y.o.  at 36w4d with a working estimated date of delivery of 2024, by Ultrasound who presents for a routine prenatal visit.     She denies vaginal bleeding, leakage of fluid, decreased fetal movements, or contractions.    Objective   Physical Exam:   Weight: 93.4 kg (206 lb)  TW.618 kg (19 lb)  BP: (!) 134/99  Fetal Heart Rate: 140 Fundal Height (cm): 36 cm Presentation: Vertex         Postpartum Depression: Medium Risk (3/11/2024)    Issue  Depression Scale     Last EPDS Total Score: 9     Last EPDS Self Harm Result: Never        Prenatal Labs  Lab Results   Component Value Date    HGB 9.6 (L) 2024    HCT 30.5 (L) 2024     2024    SYPHT Nonreactive 2024     Lab Results   Component Value Date    GLUC1P 127 2024     Assessment/Plan   Problem List Items Addressed This Visit       History of asthma    Overview     No intubations or hospitalizations          Iron deficiency anemia    Overview     : Started on iron    Most recent CBC on  9.6; scheduled for infusion on   IOL also scheduled for            Gestational hypertension, third trimester ([facility])    Overview     Dx by mild range BP readings <4hrs apart  IOL scheduled for  at 5pm         Relevant Orders    Labor Induction     Other Visit Diagnoses       36 weeks gestation of pregnancy ([facility])    -  Primary    Relevant Orders    Group B Streptococcus (GBS) Prenatal Screen, Culture          Discussed routine GBS screening, to be completed today  Discussed recommendation for IOL in setting of gHTN; scheduled for   Reviewed s/sx of PTL, warning signs, fetal movement counts, and when to call provider  Follow up for scheduled IOL.    Trinidad Zarate, CHUNG-KATELYN

## 2024-06-27 ENCOUNTER — INFUSION (OUTPATIENT)
Dept: INFUSION THERAPY | Facility: HOSPITAL | Age: 19
End: 2024-06-27
Payer: COMMERCIAL

## 2024-06-27 VITALS
WEIGHT: 206 LBS | BODY MASS INDEX: 35.17 KG/M2 | HEIGHT: 64 IN | RESPIRATION RATE: 18 BRPM | OXYGEN SATURATION: 98 % | HEART RATE: 105 BPM | TEMPERATURE: 97.5 F | DIASTOLIC BLOOD PRESSURE: 83 MMHG | SYSTOLIC BLOOD PRESSURE: 116 MMHG

## 2024-06-27 DIAGNOSIS — O26.893 PREGNANCY HEADACHE IN THIRD TRIMESTER (HHS-HCC): ICD-10-CM

## 2024-06-27 DIAGNOSIS — Z3A.32 32 WEEKS GESTATION OF PREGNANCY (HHS-HCC): ICD-10-CM

## 2024-06-27 DIAGNOSIS — R51.9 PREGNANCY HEADACHE IN THIRD TRIMESTER (HHS-HCC): ICD-10-CM

## 2024-06-27 DIAGNOSIS — Z3A.34 34 WEEKS GESTATION OF PREGNANCY (HHS-HCC): ICD-10-CM

## 2024-06-27 DIAGNOSIS — R55 SYNCOPE, UNSPECIFIED SYNCOPE TYPE: ICD-10-CM

## 2024-06-27 DIAGNOSIS — R03.0 ELEVATED BP WITHOUT DIAGNOSIS OF HYPERTENSION: ICD-10-CM

## 2024-06-27 DIAGNOSIS — O13.3 GESTATIONAL HYPERTENSION, THIRD TRIMESTER (HHS-HCC): ICD-10-CM

## 2024-06-27 DIAGNOSIS — O16.3 ELEVATED BLOOD PRESSURE AFFECTING PREGNANCY IN THIRD TRIMESTER, ANTEPARTUM (HHS-HCC): ICD-10-CM

## 2024-06-27 DIAGNOSIS — D50.8 IRON DEFICIENCY ANEMIA SECONDARY TO INADEQUATE DIETARY IRON INTAKE: ICD-10-CM

## 2024-06-27 DIAGNOSIS — Z34.00 SUPERVISION OF NORMAL FIRST PREGNANCY, ANTEPARTUM (HHS-HCC): ICD-10-CM

## 2024-06-27 LAB — GP B STREP GENITAL QL CULT: ABNORMAL

## 2024-06-27 PROCEDURE — 96366 THER/PROPH/DIAG IV INF ADDON: CPT

## 2024-06-27 PROCEDURE — 2500000004 HC RX 250 GENERAL PHARMACY W/ HCPCS (ALT 636 FOR OP/ED): Performed by: ADVANCED PRACTICE MIDWIFE

## 2024-06-27 PROCEDURE — 96365 THER/PROPH/DIAG IV INF INIT: CPT

## 2024-06-27 RX ORDER — EPINEPHRINE 0.3 MG/.3ML
0.3 INJECTION SUBCUTANEOUS EVERY 5 MIN PRN
OUTPATIENT
Start: 2024-06-27

## 2024-06-27 RX ORDER — ALBUTEROL SULFATE 0.83 MG/ML
3 SOLUTION RESPIRATORY (INHALATION) AS NEEDED
OUTPATIENT
Start: 2024-06-27

## 2024-06-27 RX ORDER — DIPHENHYDRAMINE HYDROCHLORIDE 50 MG/ML
50 INJECTION INTRAMUSCULAR; INTRAVENOUS AS NEEDED
OUTPATIENT
Start: 2024-06-27

## 2024-06-27 RX ORDER — FAMOTIDINE 10 MG/ML
20 INJECTION INTRAVENOUS ONCE AS NEEDED
OUTPATIENT
Start: 2024-06-27

## 2024-06-27 ASSESSMENT — ENCOUNTER SYMPTOMS
DEPRESSION: 0
OCCASIONAL FEELINGS OF UNSTEADINESS: 0
LOSS OF SENSATION IN FEET: 0

## 2024-06-27 ASSESSMENT — PAIN SCALES - GENERAL
PAINLEVEL: 0-NO PAIN

## 2024-06-27 NOTE — PROGRESS NOTES
"[facility] 1200 Infusion Clinic Note   Date: 2024   Name: Kaylyn Payton  : 2005   MRN: [id number]         Reason for Visit: IV IRON DEXTRAN      Accompanied by:Self   Visit Type:: Infusion   Diagnosis: Iron deficiency anemia secondary to inadequate dietary iron intake    Supervision of normal first pregnancy, antepartum ([facility])    Syncope, unspecified syncope type    Elevated BP without diagnosis of hypertension    32 weeks gestation of pregnancy ([facility])    34 weeks gestation of pregnancy ([facility])    Pregnancy headache in third trimester ([facility])    Elevated blood pressure affecting pregnancy in third trimester, antepartum ([facility])    Gestational hypertension, third trimester ([facility])    Allergies:   Allergies as of 2024 - Reviewed 2024   Allergen Reaction Noted    Orange juice Anaphylaxis 2024    Reglan [metoclopramide hcl] Anxiety 2024      Current Meds has a current medication list which includes the following prescription(s): acetaminophen, amphetamine-dextroamphetamine xr, cyclobenzaprine, diphenhydramine, docusate sodium, ferrous gluconate, and [id number]-dha-epa-fish oil, and the following Facility-Administered Medications: iron dextran complex (Infed) 975 mg in sodium chloride 0.9% 500 mL IV.        Vitals:  Vitals:    24 0820   BP: 118/80   BP Location: Right arm   Patient Position: Sitting   BP Cuff Size: Large adult long   Pulse: 106   Resp: 18   Temp: 36.8 °C (98.2 °F)   TempSrc: Temporal   SpO2: 99%   Weight: 93.4 kg (206 lb)   Height: 1.626 m (5' 4\")      Infusion Pre-procedure Checklist   Allergies reviewed: yes   Medications reviewed: yes   Contraindications to treatment:No   Previous reaction to current treatment:No   Current Health Issues: None and Pregnancy   Pain: 0-no pain [0]'    Contraindications based on patient history: No   Provider notified: Not applicable      Steadi Fall Risk  One or more falls in the last " year? No  How many Times?    Was the patient injured in the fall?    Has trouble stepping onto curb? No  Advised to use a cane or walker to get around safely? No  Often has to rush to toilet? No  Feels unsteady when walking? No  Has lost some feeling in feet? No  Often feels sad or depressed? No  Steadies self on furniture while walking at home? No  Takes medicine that makes them feel lightheaded or more tired than usual? No  Worried about Falling? No  Takes medicine to sleep or improve mood? No  Needs to push with hands when rising from a chair? No      Negative for complaint: [x] all other systems have been reviewed and are negative for complaint   Infusion Readiness:   Assessment Concerns Related to Infusion: No  Provider notified: n/a  Assess patient for the concerns below. Document provider notification as appropriate:  - Does not meet criteria to treat N/A  - Has an active or recent infection with/without current antibiotic use No  - Has recent/planned dental work No  - Has recent/planned surgeries No  - Has recently received or plans to receive vaccinations N/A  - Has treatment related toxicities N/A  - Is pregnant (unless noted otherwise) Yes      We administered iron dextran complex (Infed) 25 mg in sodium chloride 0.9% 50 mL IV and iron dextran complex (Infed) 975 mg in sodium chloride 0.9% 500 mL IV.     Dr Lillian Hsu with [facility] notified    Patient remained in office for 30 additional minutes after infusion stopped to assess for reaction.   Patient remained stable the entire 30 minutes.   Patient tolerated infusion well, no reactions or complications.   Patient discharged home ambulatory.

## 2024-06-28 ENCOUNTER — APPOINTMENT (OUTPATIENT)
Dept: OBSTETRICS AND GYNECOLOGY | Facility: HOSPITAL | Age: 19
End: 2024-06-28
Payer: COMMERCIAL

## 2024-06-28 ENCOUNTER — HOSPITAL ENCOUNTER (INPATIENT)
Facility: HOSPITAL | Age: 19
End: 2024-06-28
Attending: OBSTETRICS & GYNECOLOGY
Payer: COMMERCIAL

## 2024-06-28 DIAGNOSIS — O13.3 GESTATIONAL HYPERTENSION, THIRD TRIMESTER (HHS-HCC): ICD-10-CM

## 2024-06-28 PROBLEM — Z34.90 ENCOUNTER FOR INDUCTION OF LABOR (HHS-HCC): Status: ACTIVE | Noted: 2024-06-28

## 2024-06-28 LAB
ABO GROUP (TYPE) IN BLOOD: NORMAL
ALBUMIN SERPL BCP-MCNC: 3.4 G/DL (ref 3.4–5)
ALP SERPL-CCNC: 136 U/L (ref 33–110)
ALT SERPL W P-5'-P-CCNC: 8 U/L (ref 7–45)
ANION GAP SERPL CALC-SCNC: 15 MMOL/L (ref 10–20)
ANTIBODY SCREEN: NORMAL
AST SERPL W P-5'-P-CCNC: 17 U/L (ref 9–39)
BILIRUB SERPL-MCNC: 0.2 MG/DL (ref 0–1.2)
BUN SERPL-MCNC: 7 MG/DL (ref 6–23)
CALCIUM SERPL-MCNC: 8.8 MG/DL (ref 8.6–10.6)
CHLORIDE SERPL-SCNC: 105 MMOL/L (ref 98–107)
CO2 SERPL-SCNC: 20 MMOL/L (ref 21–32)
CREAT SERPL-MCNC: 0.36 MG/DL (ref 0.5–1.05)
CREAT UR-MCNC: 116.4 MG/DL (ref 20–320)
EGFRCR SERPLBLD CKD-EPI 2021: >90 ML/MIN/1.73M*2
ERYTHROCYTE [DISTWIDTH] IN BLOOD BY AUTOMATED COUNT: 14.3 % (ref 11.5–14.5)
GLUCOSE SERPL-MCNC: 88 MG/DL (ref 74–99)
HCT VFR BLD AUTO: 30.4 % (ref 36–46)
HGB BLD-MCNC: 9.3 G/DL (ref 12–16)
MCH RBC QN AUTO: 26.9 PG (ref 26–34)
MCHC RBC AUTO-ENTMCNC: 30.6 G/DL (ref 32–36)
MCV RBC AUTO: 88 FL (ref 80–100)
NRBC BLD-RTO: 1.4 /100 WBCS (ref 0–0)
PLATELET # BLD AUTO: 323 X10*3/UL (ref 150–450)
POTASSIUM SERPL-SCNC: 4.2 MMOL/L (ref 3.5–5.3)
PROT SERPL-MCNC: 6.2 G/DL (ref 6.4–8.2)
PROT UR-ACNC: 13 MG/DL (ref 5–24)
PROT/CREAT UR: 0.11 MG/MG CREAT (ref 0–0.17)
RBC # BLD AUTO: 3.46 X10*6/UL (ref 4–5.2)
RH FACTOR (ANTIGEN D): NORMAL
SODIUM SERPL-SCNC: 136 MMOL/L (ref 136–145)
TREPONEMA PALLIDUM IGG+IGM AB [PRESENCE] IN SERUM OR PLASMA BY IMMUNOASSAY: NONREACTIVE
WBC # BLD AUTO: 10.4 X10*3/UL (ref 4.4–11.3)

## 2024-06-28 PROCEDURE — 86901 BLOOD TYPING SEROLOGIC RH(D): CPT

## 2024-06-28 PROCEDURE — 1120000001 HC OB PRIVATE ROOM DAILY

## 2024-06-28 PROCEDURE — 36415 COLL VENOUS BLD VENIPUNCTURE: CPT

## 2024-06-28 PROCEDURE — 2500000004 HC RX 250 GENERAL PHARMACY W/ HCPCS (ALT 636 FOR OP/ED)

## 2024-06-28 PROCEDURE — 82570 ASSAY OF URINE CREATININE: CPT

## 2024-06-28 PROCEDURE — 3E033VJ INTRODUCTION OF OTHER HORMONE INTO PERIPHERAL VEIN, PERCUTANEOUS APPROACH: ICD-10-PCS

## 2024-06-28 PROCEDURE — 86780 TREPONEMA PALLIDUM: CPT

## 2024-06-28 PROCEDURE — 80053 COMPREHEN METABOLIC PANEL: CPT

## 2024-06-28 PROCEDURE — 7210000002 HC LABOR PER HOUR

## 2024-06-28 PROCEDURE — 85027 COMPLETE CBC AUTOMATED: CPT

## 2024-06-28 PROCEDURE — 86923 COMPATIBILITY TEST ELECTRIC: CPT

## 2024-06-28 RX ORDER — PENICILLIN G 3000000 [IU]/50ML
3 INJECTION, SOLUTION INTRAVENOUS EVERY 4 HOURS
Status: DISCONTINUED | OUTPATIENT
Start: 2024-06-28 | End: 2024-06-29 | Stop reason: HOSPADM

## 2024-06-28 RX ORDER — ONDANSETRON 4 MG/1
4 TABLET, FILM COATED ORAL EVERY 6 HOURS PRN
Status: DISCONTINUED | OUTPATIENT
Start: 2024-06-28 | End: 2024-06-29

## 2024-06-28 RX ORDER — OXYTOCIN 10 [USP'U]/ML
10 INJECTION, SOLUTION INTRAMUSCULAR; INTRAVENOUS ONCE AS NEEDED
Status: DISCONTINUED | OUTPATIENT
Start: 2024-06-28 | End: 2024-06-29 | Stop reason: HOSPADM

## 2024-06-28 RX ORDER — LOPERAMIDE HYDROCHLORIDE 2 MG/1
4 CAPSULE ORAL EVERY 2 HOUR PRN
Status: CANCELLED | OUTPATIENT
Start: 2024-06-28

## 2024-06-28 RX ORDER — ONDANSETRON 4 MG/1
4 TABLET, FILM COATED ORAL EVERY 6 HOURS PRN
Status: CANCELLED | OUTPATIENT
Start: 2024-06-28

## 2024-06-28 RX ORDER — CARBOPROST TROMETHAMINE 250 UG/ML
250 INJECTION, SOLUTION INTRAMUSCULAR ONCE AS NEEDED
Status: DISCONTINUED | OUTPATIENT
Start: 2024-06-28 | End: 2024-06-29 | Stop reason: HOSPADM

## 2024-06-28 RX ORDER — SODIUM CHLORIDE, SODIUM LACTATE, POTASSIUM CHLORIDE, CALCIUM CHLORIDE 600; 310; 30; 20 MG/100ML; MG/100ML; MG/100ML; MG/100ML
125 INJECTION, SOLUTION INTRAVENOUS CONTINUOUS
Status: CANCELLED | OUTPATIENT
Start: 2024-06-28

## 2024-06-28 RX ORDER — LABETALOL HYDROCHLORIDE 5 MG/ML
20 INJECTION, SOLUTION INTRAVENOUS ONCE AS NEEDED
Status: CANCELLED | OUTPATIENT
Start: 2024-06-28

## 2024-06-28 RX ORDER — PENICILLIN G 3000000 [IU]/50ML
3 INJECTION, SOLUTION INTRAVENOUS EVERY 4 HOURS
Status: CANCELLED | OUTPATIENT
Start: 2024-06-28

## 2024-06-28 RX ORDER — OXYTOCIN 10 [USP'U]/ML
10 INJECTION, SOLUTION INTRAMUSCULAR; INTRAVENOUS ONCE AS NEEDED
Status: CANCELLED | OUTPATIENT
Start: 2024-06-28

## 2024-06-28 RX ORDER — NIFEDIPINE 10 MG/1
10 CAPSULE ORAL ONCE AS NEEDED
Status: CANCELLED | OUTPATIENT
Start: 2024-06-28

## 2024-06-28 RX ORDER — NIFEDIPINE 10 MG/1
10 CAPSULE ORAL ONCE AS NEEDED
Status: DISCONTINUED | OUTPATIENT
Start: 2024-06-28 | End: 2024-06-29 | Stop reason: HOSPADM

## 2024-06-28 RX ORDER — CARBOPROST TROMETHAMINE 250 UG/ML
250 INJECTION, SOLUTION INTRAMUSCULAR ONCE AS NEEDED
Status: CANCELLED | OUTPATIENT
Start: 2024-06-28

## 2024-06-28 RX ORDER — MORPHINE SULFATE 2 MG/ML
2 INJECTION, SOLUTION INTRAMUSCULAR; INTRAVENOUS ONCE
Status: COMPLETED | OUTPATIENT
Start: 2024-06-28 | End: 2024-06-28

## 2024-06-28 RX ORDER — MISOPROSTOL 200 UG/1
800 TABLET ORAL ONCE AS NEEDED
Status: CANCELLED | OUTPATIENT
Start: 2024-06-28

## 2024-06-28 RX ORDER — TERBUTALINE SULFATE 1 MG/ML
0.25 INJECTION SUBCUTANEOUS ONCE AS NEEDED
Status: CANCELLED | OUTPATIENT
Start: 2024-06-28

## 2024-06-28 RX ORDER — OXYTOCIN/0.9 % SODIUM CHLORIDE 30/500 ML
60 PLASTIC BAG, INJECTION (ML) INTRAVENOUS ONCE AS NEEDED
Status: COMPLETED | OUTPATIENT
Start: 2024-06-28 | End: 2024-06-29

## 2024-06-28 RX ORDER — TRANEXAMIC ACID 100 MG/ML
1000 INJECTION, SOLUTION INTRAVENOUS ONCE AS NEEDED
Status: DISCONTINUED | OUTPATIENT
Start: 2024-06-28 | End: 2024-06-29 | Stop reason: HOSPADM

## 2024-06-28 RX ORDER — HYDRALAZINE HYDROCHLORIDE 20 MG/ML
5 INJECTION INTRAMUSCULAR; INTRAVENOUS ONCE AS NEEDED
Status: CANCELLED | OUTPATIENT
Start: 2024-06-28

## 2024-06-28 RX ORDER — LABETALOL HYDROCHLORIDE 5 MG/ML
20 INJECTION, SOLUTION INTRAVENOUS ONCE AS NEEDED
Status: DISCONTINUED | OUTPATIENT
Start: 2024-06-28 | End: 2024-06-29 | Stop reason: HOSPADM

## 2024-06-28 RX ORDER — LIDOCAINE HYDROCHLORIDE 10 MG/ML
30 INJECTION INFILTRATION; PERINEURAL ONCE AS NEEDED
Status: DISCONTINUED | OUTPATIENT
Start: 2024-06-28 | End: 2024-06-29 | Stop reason: HOSPADM

## 2024-06-28 RX ORDER — TRANEXAMIC ACID 100 MG/ML
1000 INJECTION, SOLUTION INTRAVENOUS ONCE AS NEEDED
Status: CANCELLED | OUTPATIENT
Start: 2024-06-28

## 2024-06-28 RX ORDER — OXYTOCIN/0.9 % SODIUM CHLORIDE 30/500 ML
2-30 PLASTIC BAG, INJECTION (ML) INTRAVENOUS CONTINUOUS
Status: DISCONTINUED | OUTPATIENT
Start: 2024-06-28 | End: 2024-06-29

## 2024-06-28 RX ORDER — LIDOCAINE HYDROCHLORIDE 10 MG/ML
30 INJECTION INFILTRATION; PERINEURAL ONCE AS NEEDED
Status: CANCELLED | OUTPATIENT
Start: 2024-06-28

## 2024-06-28 RX ORDER — HYDRALAZINE HYDROCHLORIDE 20 MG/ML
5 INJECTION INTRAMUSCULAR; INTRAVENOUS ONCE AS NEEDED
Status: DISCONTINUED | OUTPATIENT
Start: 2024-06-28 | End: 2024-06-29 | Stop reason: HOSPADM

## 2024-06-28 RX ORDER — LOPERAMIDE HYDROCHLORIDE 2 MG/1
4 CAPSULE ORAL EVERY 2 HOUR PRN
Status: DISCONTINUED | OUTPATIENT
Start: 2024-06-28 | End: 2024-06-29 | Stop reason: HOSPADM

## 2024-06-28 RX ORDER — ONDANSETRON HYDROCHLORIDE 2 MG/ML
4 INJECTION, SOLUTION INTRAVENOUS EVERY 6 HOURS PRN
Status: CANCELLED | OUTPATIENT
Start: 2024-06-28

## 2024-06-28 RX ORDER — TERBUTALINE SULFATE 1 MG/ML
0.25 INJECTION SUBCUTANEOUS ONCE AS NEEDED
Status: DISCONTINUED | OUTPATIENT
Start: 2024-06-28 | End: 2024-06-29 | Stop reason: HOSPADM

## 2024-06-28 RX ORDER — METHYLERGONOVINE MALEATE 0.2 MG/ML
0.2 INJECTION INTRAVENOUS ONCE AS NEEDED
Status: DISCONTINUED | OUTPATIENT
Start: 2024-06-28 | End: 2024-06-29 | Stop reason: HOSPADM

## 2024-06-28 RX ORDER — ONDANSETRON HYDROCHLORIDE 2 MG/ML
4 INJECTION, SOLUTION INTRAVENOUS EVERY 6 HOURS PRN
Status: DISCONTINUED | OUTPATIENT
Start: 2024-06-28 | End: 2024-06-29

## 2024-06-28 RX ORDER — METHYLERGONOVINE MALEATE 0.2 MG/ML
0.2 INJECTION INTRAVENOUS ONCE AS NEEDED
Status: CANCELLED | OUTPATIENT
Start: 2024-06-28

## 2024-06-28 RX ORDER — SODIUM CHLORIDE, SODIUM LACTATE, POTASSIUM CHLORIDE, CALCIUM CHLORIDE 600; 310; 30; 20 MG/100ML; MG/100ML; MG/100ML; MG/100ML
125 INJECTION, SOLUTION INTRAVENOUS CONTINUOUS
Status: DISCONTINUED | OUTPATIENT
Start: 2024-06-28 | End: 2024-06-29

## 2024-06-28 RX ORDER — MISOPROSTOL 200 UG/1
800 TABLET ORAL ONCE AS NEEDED
Status: DISCONTINUED | OUTPATIENT
Start: 2024-06-28 | End: 2024-06-29 | Stop reason: HOSPADM

## 2024-06-28 RX ORDER — OXYTOCIN/0.9 % SODIUM CHLORIDE 30/500 ML
60 PLASTIC BAG, INJECTION (ML) INTRAVENOUS ONCE AS NEEDED
Status: CANCELLED | OUTPATIENT
Start: 2024-06-28

## 2024-06-28 SDOH — HEALTH STABILITY: MENTAL HEALTH: WISH TO BE DEAD (PAST 1 MONTH): NO

## 2024-06-28 SDOH — HEALTH STABILITY: MENTAL HEALTH: WERE YOU ABLE TO COMPLETE ALL THE BEHAVIORAL HEALTH SCREENINGS?: YES

## 2024-06-28 SDOH — SOCIAL STABILITY: SOCIAL INSECURITY: HAS ANYONE EVER THREATENED TO HURT YOUR FAMILY OR YOUR PETS?: NO

## 2024-06-28 SDOH — SOCIAL STABILITY: SOCIAL INSECURITY: DOES ANYONE TRY TO KEEP YOU FROM HAVING/CONTACTING OTHER FRIENDS OR DOING THINGS OUTSIDE YOUR HOME?: NO

## 2024-06-28 SDOH — HEALTH STABILITY: MENTAL HEALTH: SUICIDAL BEHAVIOR (LIFETIME): NO

## 2024-06-28 SDOH — HEALTH STABILITY: MENTAL HEALTH: NON-SPECIFIC ACTIVE SUICIDAL THOUGHTS (PAST 1 MONTH): NO

## 2024-06-28 SDOH — HEALTH STABILITY: MENTAL HEALTH: HAVE YOU USED ANY PRESCRIPTION DRUGS OTHER THAN PRESCRIBED IN THE PAST 12 MONTHS?: NO

## 2024-06-28 SDOH — HEALTH STABILITY: MENTAL HEALTH: HAVE YOU USED ANY SUBSTANCES (CANABIS, COCAINE, HEROIN, HALLUCINOGENS, INHALANTS, ETC.) IN THE PAST 12 MONTHS?: NO

## 2024-06-28 SDOH — SOCIAL STABILITY: SOCIAL INSECURITY: PHYSICAL ABUSE: DENIES

## 2024-06-28 SDOH — SOCIAL STABILITY: SOCIAL INSECURITY: ABUSE SCREEN: ADULT

## 2024-06-28 SDOH — ECONOMIC STABILITY: HOUSING INSECURITY: DO YOU FEEL UNSAFE GOING BACK TO THE PLACE WHERE YOU ARE LIVING?: NO

## 2024-06-28 SDOH — SOCIAL STABILITY: SOCIAL INSECURITY: HAVE YOU HAD THOUGHTS OF HARMING ANYONE ELSE?: NO

## 2024-06-28 SDOH — HEALTH STABILITY: MENTAL HEALTH: STRENGTHS (MUST CHOOSE TWO): SUPPORT FROM FAMILY;SUPPORT FROM FRIENDS

## 2024-06-28 SDOH — SOCIAL STABILITY: SOCIAL INSECURITY: ARE THERE ANY APPARENT SIGNS OF INJURIES/BEHAVIORS THAT COULD BE RELATED TO ABUSE/NEGLECT?: NO

## 2024-06-28 SDOH — SOCIAL STABILITY: SOCIAL INSECURITY: ARE YOU OR HAVE YOU BEEN THREATENED OR ABUSED PHYSICALLY, EMOTIONALLY, OR SEXUALLY BY ANYONE?: NO

## 2024-06-28 SDOH — SOCIAL STABILITY: SOCIAL INSECURITY: DO YOU FEEL ANYONE HAS EXPLOITED OR TAKEN ADVANTAGE OF YOU FINANCIALLY OR OF YOUR PERSONAL PROPERTY?: NO

## 2024-06-28 SDOH — SOCIAL STABILITY: SOCIAL INSECURITY: HAVE YOU HAD ANY THOUGHTS OF HARMING ANYONE ELSE?: NO

## 2024-06-28 SDOH — SOCIAL STABILITY: SOCIAL INSECURITY: VERBAL ABUSE: DENIES

## 2024-06-28 ASSESSMENT — PAIN SCALES - GENERAL
PAINLEVEL_OUTOF10: 0 - NO PAIN

## 2024-06-28 ASSESSMENT — ACTIVITIES OF DAILY LIVING (ADL)
ADL_BEFORE_ADMISSION: RIGHT
ADEQUATE_TO_COMPLETE_ADL: YES
BATHING: INDEPENDENT
TOILETING: INDEPENDENT
HEARING_RIGHT_EAR: NO PROBLEMS
HOW_WELL_CAN_YOU_BATHE_YOURSELF: INDEPENDENTLY
ADEQUATE_TO_COMPLETE_ADL: YES
HOW_WELL_CAN_YOU_COMPLETE_GROOMING_TASKS: INDEPENDENTLY
WALKS_IN_HOME: INDEPENDENTLY
HOW_WELL_CAN_YOU_FEED_YOURSELF: INDEPENDENTLY
HOW_WELL_CAN_YOU_USE_BATHROOM_BY_YOURSELF: INDEPENDENTLY
ADL_BEFORE_ADMISSION: INDEPENDENTLY
HOW_WELL_CAN_YOU_DRESS_YOURSELF: INDEPENDENTLY
HEARING_LEFT_EAR: NO PROBLEMS
DRESSING: INDEPENDENT
FEEDING: INDEPENDENT

## 2024-06-28 ASSESSMENT — LIFESTYLE VARIABLES
AUDIT-C TOTAL SCORE: 0
HOW OFTEN DO YOU HAVE A DRINK CONTAINING ALCOHOL: NEVER
AUDIT-C TOTAL SCORE: 0
HOW MANY STANDARD DRINKS CONTAINING ALCOHOL DO YOU HAVE ON A TYPICAL DAY: PATIENT DOES NOT DRINK
HOW OFTEN DO YOU HAVE 6 OR MORE DRINKS ON ONE OCCASION: NEVER
SKIP TO QUESTIONS 9-10: 1

## 2024-06-28 NOTE — H&P
Obstetrical Admission History and Physical     Kaylyn Payton is a 18 y.o.  at 37w0d. ADRIAN: 2024, by Ultrasound. Estimated fetal weight: 6lbs. She has had prenatal care with MARCELO Perry CNM .    Chief Complaint: Scheduled Induction    Assessment/Plan    IOL, Rasheed 7  - Admit to L&D with routine lab orders  - Hemorrhage risk screen medium; Type& Screen ordered  - Induction course to be determined by oncoming CNM  - General diet now, clear liquids with start of oxytocin, epidural, or nearing active phase  - Labor analgesia PRN  - encourage position changes  - Recheck cervix as clinically indicated by maternal or fetal status  - Anticipate SVB    Cat 2 FHR tracing  - 3.5min prolonged deceleration to betsy of 80bpm noted from [id number]; this was both preceded by and recovered to a Cat I fht, following position changes  - continue cEFM and intrauterine resuscitation as needed    GBS Positive  - prophylaxis with PCN q4hr    Class II Obesity  - s/p nml growth scan at 31.4 weeks with EFW of 35% at that time    GHTN  - dx by multiple MRBP in antepartum  - s/p nml HELLP labs on , PCR was 0.13 at that time  - CBC, CMP, and PCR repeated on admission today  - Pt denies HA, vision changes, or RUQ pain  - will avoid methergine in event of PPH    Anemia  - s/p IV iron x1 yesterday  - most recent Hgb 9.6 on   - CBC collected on admission; will type and cross for Hgb <10    Asthma  - pt denies inhaler use  - will avoid hemabate in event of PPH    Dr. Whalen aware of admission and plan of care    CHUNG Arteaga-KATELYN      Principal Problem:    Encounter for induction of labor ([facility])      Pregnancy Problems (from 23 to present)       Problem Noted Resolved    34 weeks gestation of pregnancy ([facility]) 2024 by Krystal Ho, RN No    Priority:  Medium      Pregnancy headache in third trimester ([facility]) 2024 by Krystal Ho, RN No    Priority:  Medium      Elevated blood pressure affecting pregnancy  in third trimester, antepartum ([facility]) 6/18/2024 by Krystal Ho, RN No    Priority:  Medium      Overview Signed 6/18/2024  8:50 AM by Krystal Ho RN     3/11: 143/88, unable to recheck prior to pt leaving appt  6/18: 136/91 with recheck of 127/90         Gestational hypertension, third trimester ([facility]) 6/18/2024 by ERIN Guerra No    Priority:  Medium      Overview Addendum 6/25/2024  3:20 PM by PHONG Sutton     Dx by mild range BP readings <4hrs apart  IOL scheduled for 6/28 at 5pm         32 weeks gestation of pregnancy ([facility]) 5/30/2024 by PHONG Peacock, APRN-CNP No    Priority:  Medium      Iron deficiency anemia 4/9/2024 by PHONG Gutiérrez No    Priority:  Medium      Overview Addendum 6/25/2024  3:22 PM by PHONG Sutton     4/9: Started on iron    Most recent CBC on 6/18 9.6; scheduled for infusion on 6/28  IOL also scheduled for 6/28           Elevated BP without diagnosis of hypertension 3/11/2024 by PHONG Gutiérrez No    Priority:  Medium      Overview Addendum 3/11/2024 11:20 AM by PHONG Gutiérrez     3/11: 143/88, patient left before repeat was possible         Syncope 2/12/2024 by PHONG Gutiérrez No    Priority:  Medium      Overview Signed 2/12/2024 10:13 AM by PHONG Gutiérrez     Encouraged high protein and increasing water intake          Supervision of normal first pregnancy, antepartum ([facility]) 12/27/2023 by PHONG Hein No    Priority:  Medium      Overview Addendum 6/12/2024 12:20 PM by PHONG Peacock, APRN-CNP     Desired provider in labor: [x] CNM  [] Physician  [x] Initial BMI: 33.13  [x] Prenatal Labs: WNL except rubella non immune  [x] Rh status: A+  [x] Genetic Screening: rr cf DNA   [x] Baby ASA: prescribed 1/15  [x] Dating confirmation with US done at [facility]     [x] Anatomy US: WNL 2/28  [x] Fetal Sex:  female  [x] Patient added to BirthTracks  [x] 1hr GCT at 24-28wks: WNL at 25w    [x] Tdap (27-36wks):  [x] Flu Shot: completed 12/27/23    [x] Breastfeeding - pump ordered   [x] Pain management during labor: planning epidural   [x] Postpartum Birth control method:  Mirena IUD immediate PP  [] GBS at 36 wks:  [] Labor support:         Initial obstetric visit in first trimester ([facility]) 12/27/2023 by PHONG Hein 1/13/2024 by PHONG Gutiérrez    10 weeks gestation of pregnancy ([facility]) 12/27/2023 by PHONG Hein 1/13/2024 by PHONG Gutiérrez          Options for delivery have been discussed with the patient and she elects for an induction of labor.  Cervical ripening with cytotec, cervidil, other prostaglandin agents has been discussed.  Induction of labor with pitocin, amniotomy, cytotec, and cervical balloon have been discussed in detail. The risks, benefits, complications, alternatives, expected outcomes, potential problems during recuperation and recovery, and the risks of not performing the procedure were discussed with the patient. The patient stated understanding that the risks of delivery include, but are not limited to: death; reaction to medications; injury to bowel, bladder, ureters, uterus, cervix, vagina, and other pelvic and abdominal structures, infection; blood loss and possible need for transfusion; and potential need for surgery, including hysterectomy. The risks of injury to the infant during delivery were also discussed. All questions were answered. There was concurrence with the planned procedure, and the patient wanted to proceed.    Admit to inpatient status. I anticipate that this patient will require a stay exceeding at least 2 midnights for delivery and postpartum.  Induction of labor.  Management of pregnancy complications, as indicated.    Subjective   Good fetal movement. Denies vaginal bleeding., Denies contractions., Denies  leaking of fluid. Denies HA, vision changes, or RUQ pain.     Reason for Induction of Labor:  Gestational Hypertension       Obstetrical History   OB History    Para Term  AB Living   2       1     SAB IAB Ectopic Multiple Live Births   1              # Outcome Date GA Lbr Jono/2nd Weight Sex Delivery Anes PTL Lv   2 Current            1 SAB                Past Medical History  Past Medical History:   Diagnosis Date    ADHD     Asthma ([facility])         Past Surgical History   History reviewed. No pertinent surgical history.    Social History  Social History     Tobacco Use    Smoking status: Never    Smokeless tobacco: Never   Substance Use Topics    Alcohol use: Not Currently     Substance and Sexual Activity   Drug Use Never       Allergies  Orange juice and Reglan [metoclopramide hcl]     Medications  Medications Prior to Admission   Medication Sig Dispense Refill Last Dose    acetaminophen (Tylenol) 500 mg tablet Take 2 tablets (1,000 mg) by mouth every 6 hours if needed for headaches, mild pain (1 - 3) or moderate pain (4 - 6). 30 tablet 2     cyclobenzaprine (Flexeril) 10 mg tablet Take 0.5 tablets (5 mg) by mouth once daily for 6 days. 3 tablet 0     diphenhydrAMINE (Sominex) 25 mg tablet Take 1 tablet (25 mg) by mouth if needed (headache). 30 tablet 0     docusate sodium (Colace) 100 mg capsule Take 1 capsule (100 mg) by mouth as needed at bedtime for constipation. 30 capsule 3     ferrous gluconate (Fergon) 240 (27 Fe) MG tablet Take 2 tablets (480 mg) by mouth every other day. One pill twice a day every other day 60 tablet 3     [id number]-FA-[id number]-dha-epa-fish oil 400 mcg-35 mg -25 mg-5 mg tablet,chewable Chew 400 mcg once daily. 1 tablet 11        Objective    Last Vitals  Temp Pulse Resp BP MAP O2 Sat   36.6 °C (97.9 °F) 101 18 (!) 119/95   98 %     Physical Examination  GENERAL: Examination reveals a well developed, well nourished and obese, gravid female in no acute distress. She is alert and  cooperative.  HEENT:  normocephalic, atraumatic  LUNGS:  regular, unlabored respirations  ABDOMEN: soft, gravid, nontender, nondistended, no abnormal masses, no epigastric pain  FHR: 140bpm, mod variability, + accels, 3.5min prolonged deceleration to betsy of 80bpm noted from [id number]   [place] reading: rare ctx appreciated  The fetus is in a vertex presentation, determined by ultrasound and Leopold's maneuver  Current Estimated Fetal Weight 6lbs established by Leopold's maneuver  VAGINA: normal appearing vagina with normal color and discharge and no lesions noted  CERVIX:  1.5/70/-2, soft, mid-position ; MEMBRANES are intact  SKIN: normal coloration and turgor, no rashes  NEUROLOGICAL: alert, oriented, normal speech, no focal findings or movement disorder noted  PSYCHOLOGICAL: awake and alert; oriented to person, place, and time    Lab Review  Labs in chart were reviewed. GBS Positive.

## 2024-06-29 ENCOUNTER — ANESTHESIA (OUTPATIENT)
Dept: OBSTETRICS AND GYNECOLOGY | Facility: HOSPITAL | Age: 19
End: 2024-06-29
Payer: COMMERCIAL

## 2024-06-29 ENCOUNTER — ANESTHESIA EVENT (OUTPATIENT)
Dept: OBSTETRICS AND GYNECOLOGY | Facility: HOSPITAL | Age: 19
End: 2024-06-29
Payer: COMMERCIAL

## 2024-06-29 PROBLEM — Z3A.37 37 WEEKS GESTATION OF PREGNANCY (HHS-HCC): Status: ACTIVE | Noted: 2024-05-30

## 2024-06-29 PROBLEM — B95.1 POSITIVE GBS TEST: Status: ACTIVE | Noted: 2024-06-29

## 2024-06-29 PROBLEM — J45.909 ASTHMA (HHS-HCC): Status: ACTIVE | Noted: 2024-06-29

## 2024-06-29 PROBLEM — O99.013 ANEMIA AFFECTING PREGNANCY IN THIRD TRIMESTER (HHS-HCC): Status: ACTIVE | Noted: 2024-04-09

## 2024-06-29 PROBLEM — Z34.90 ENCOUNTER FOR INDUCTION OF LABOR (HHS-HCC): Status: ACTIVE | Noted: 2023-12-27

## 2024-06-29 LAB
BLOOD EXPIRATION DATE: NORMAL
DISPENSE STATUS: NORMAL
PRODUCT BLOOD TYPE: 6200
PRODUCT CODE: NORMAL
UNIT ABO: NORMAL
UNIT NUMBER: NORMAL
UNIT RH: NORMAL
UNIT VOLUME: 350
XM INTEP: NORMAL

## 2024-06-29 PROCEDURE — 88307 TISSUE EXAM BY PATHOLOGIST: CPT | Mod: TC,SUR

## 2024-06-29 PROCEDURE — 51701 INSERT BLADDER CATHETER: CPT

## 2024-06-29 PROCEDURE — 51702 INSERT TEMP BLADDER CATH: CPT

## 2024-06-29 PROCEDURE — 1210000001 HC SEMI-PRIVATE ROOM DAILY

## 2024-06-29 PROCEDURE — 2500000004 HC RX 250 GENERAL PHARMACY W/ HCPCS (ALT 636 FOR OP/ED)

## 2024-06-29 PROCEDURE — 59409 OBSTETRICAL CARE: CPT

## 2024-06-29 PROCEDURE — 3700000014 EPIDURAL BLOCK

## 2024-06-29 PROCEDURE — 2500000005 HC RX 250 GENERAL PHARMACY W/O HCPCS

## 2024-06-29 PROCEDURE — 7210000002 HC LABOR PER HOUR

## 2024-06-29 PROCEDURE — 10907ZC DRAINAGE OF AMNIOTIC FLUID, THERAPEUTIC FROM PRODUCTS OF CONCEPTION, VIA NATURAL OR ARTIFICIAL OPENING: ICD-10-PCS | Performed by: NURSE PRACTITIONER

## 2024-06-29 PROCEDURE — 2500000001 HC RX 250 WO HCPCS SELF ADMINISTERED DRUGS (ALT 637 FOR MEDICARE OP)

## 2024-06-29 RX ORDER — ACETAMINOPHEN 325 MG/1
975 TABLET ORAL EVERY 6 HOURS
Status: DISPENSED | OUTPATIENT
Start: 2024-06-29

## 2024-06-29 RX ORDER — FENTANYL/BUPIVACAINE/NS/PF 2MCG/ML-.1
0-54 PLASTIC BAG, INJECTION (ML) INJECTION CONTINUOUS
Status: DISCONTINUED | OUTPATIENT
Start: 2024-06-29 | End: 2024-06-29

## 2024-06-29 RX ORDER — DIPHENHYDRAMINE HYDROCHLORIDE 50 MG/ML
25 INJECTION INTRAMUSCULAR; INTRAVENOUS ONCE
Status: COMPLETED | OUTPATIENT
Start: 2024-06-29 | End: 2024-06-29

## 2024-06-29 RX ORDER — IBUPROFEN 600 MG/1
600 TABLET ORAL EVERY 6 HOURS
Status: DISPENSED | OUTPATIENT
Start: 2024-06-29

## 2024-06-29 RX ORDER — FENTANYL/BUPIVACAINE/NS/PF 2MCG/ML-.1
PLASTIC BAG, INJECTION (ML) INJECTION CONTINUOUS PRN
Status: DISCONTINUED | OUTPATIENT
Start: 2024-06-29 | End: 2024-06-29

## 2024-06-29 RX ORDER — FENTANYL/BUPIVACAINE/NS/PF 2MCG/ML-.1
PLASTIC BAG, INJECTION (ML) INJECTION AS NEEDED
Status: DISCONTINUED | OUTPATIENT
Start: 2024-06-29 | End: 2024-06-29

## 2024-06-29 RX ADMIN — FENTANYL CITRATE 14 ML/HR: 50 INJECTION, SOLUTION INTRAMUSCULAR; INTRAVENOUS at 04:59

## 2024-06-29 RX ADMIN — FENTANYL CITRATE 5 ML: 50 INJECTION INTRAMUSCULAR; INTRAVENOUS at 04:54

## 2024-06-29 RX ADMIN — FENTANYL CITRATE 2 ML: 50 INJECTION INTRAMUSCULAR; INTRAVENOUS at 04:59

## 2024-06-29 SDOH — HEALTH STABILITY: MENTAL HEALTH: CURRENT SMOKER: 0

## 2024-06-29 ASSESSMENT — PAIN SCALES - GENERAL

## 2024-06-29 ASSESSMENT — PAIN SCALES - WONG BAKER
WONGBAKER_NUMERICALRESPONSE: NO HURT
WONGBAKER_NUMERICALRESPONSE: NO HURT

## 2024-06-29 ASSESSMENT — ACTIVITIES OF DAILY LIVING (ADL): LACK_OF_TRANSPORTATION: NO

## 2024-06-29 NOTE — SIGNIFICANT EVENT
Assessment    18 y.o.  at 37w1d  FHT Category 1  Latent labor    Principal Problem:    Encounter for induction of labor ([facility])  Active Problems:    Gestational hypertension, third trimester ([facility])    Anemia affecting pregnancy in third trimester ([facility])    Asthma ([facility])    Positive GBS test    Plan    Discussed with patient current assessment of CE and recommendation for replacement of CRB and further ripening with cytotec.  Patient verbalizes understanding and agreeable to plan  Encourage frequent position changes as tolerated  Continue assessment of maternal and fetal wellbeing  Recheck as clinically indicated by maternal or fetal status  Patient status and plan of care reviewed with Dr. Olivier  Anticipate active phase of labor    Alee Madrid, APRN-CNM, APRN-CNP    Subjective:  Kaylyn Payton is comfortable with epidural, denies concerns at this time.     Objective:  Fetal Monitoring      Baseline FHR: 150 per minute  Variability: moderate  Accelerations: yes  Decelerations: variable decel noted while patient supine for CE  TOCO: regular, every 5-6 minutes    Cervical Exam: CNM unable to successfully reach cervix.    1 cm dilated, 50 effaced, -3 station, cervical position high, posterior to patient's right  (CE by Dr. Dinero); cephalic presentation confirmed again with BSUS    Membrane status: intact    Pitocin was at 14 mu/min, turned off during decel.    Vitals:    24 1129 24 1132 24 1133 24 1134   BP:  (!) 88/54 92/62    Pulse: 100 96 101 102   Resp:       Temp:       TempSrc:       SpO2: 98%   97%   Weight:       Height:

## 2024-06-29 NOTE — PROGRESS NOTES
Assessment    18 y.o.  at 37w1d  FHT Category 1  Latent labor    Principal Problem:    Encounter for induction of labor ([facility])  Active Problems:    Gestational hypertension, third trimester ([facility])    Anemia affecting pregnancy in third trimester ([facility])    Asthma ([facility])    Positive GBS test    Plan    Successfully AROM'd for large amount of clear fluid  Patient repositioned to throne  Encourage frequent position changes as tolerated  Start/Continue pitocin per protocol  Continue assessment of maternal and fetal wellbeing  Recheck as clinically indicated by maternal or fetal status  Patient status and plan of care reviewed with Dr. Olivier  Anticipate active phase of labor    Alee Madrid, APRN-CNM, APRN-CNP    Subjective:  Multiple CRB placement attempts made unsuccessfully. CRB balloon expels when attempting to confirm placement. CE repeated and found to be 4-5/70/-3, with cervix significantly high in pelvic but anterior. Patient agreeable to AROM.     Objective:  Fetal Monitoring      Baseline FHR: 140 per minute  Variability: moderate  Accelerations: yes  Decelerations: none  TOCO: regular, every 3 minutes    Cervical Exam:  5 cm dilated, 70 effaced, -3 station    Membrane status: ruptured, clear fluid    Pitocin is currently off.     Vitals:    24 1132 24 1133 24 1134 24 1258   BP: (!) 88/54 92/62  104/62   Pulse: 96 101 102 73   Resp:  18  18   Temp:  36.8 °C (98.2 °F)  36.8 °C (98.2 °F)   TempSrc:  Temporal  Temporal   SpO2:  98% 97% 98%   Weight:       Height:

## 2024-06-29 NOTE — PROGRESS NOTES
DELAYED ENTRY DUE TO BEING CALLED TO DELIVERY AND BEGINNING OTHER IOL:    Assessment    18 y.o.  at 37w0d  FHT Category 1  Latent labor  IOL gHTN; HELLP labs WNL; s/p CRB; on pitocin  Anemia; admission hemoglobin 9.3; T&C for 1 pRBC  Class II obesity  Childhood asthma  GBS positive; on PCN    Plan    Encourage frequent position changes as tolerated  Start/Continue pitocin per protocol  Pain management per patient request  PCN GBS prophylaxis  Continue assessment of maternal and fetal wellbeing  Recheck as clinically indicated by maternal or fetal status  Anticipate active phase of labor    CRB placed at ; out at approximately 2130    Trinidad Zarate, APRN-ALLISONM    Subjective:  Kaylyn Payton is sitting in high fowlers; agreeable to CRB and pitocin to begin IOL    Objective:  Fetal Monitoring      Baseline FHR: 140 per minute  Variability: moderate  Accelerations: yes  Decelerations: none  TOCO:  irregular    Cervical Exam:  deferred from admission SVE    Membrane status: intact    Pitocin is at 2 mu/min.    Vitals:    24 2158 24 2203 24 2208 24 2213   BP:       Pulse: 94 98 92 106   Resp:       Temp:       TempSrc:       SpO2: 98% 97% 97% 98%   Weight:       Height:

## 2024-06-29 NOTE — SIGNIFICANT EVENT
S: Pushing with patient at bedside. Legs supported by FOB and pt mother. She is comfortable with her epidural. She requests warm compresses with . No longer interested in PPIUD.    O: FHR 150bpm, mod variability, no accels, no decels  [place] q2-3min  Fetal descent noted with pushing efforts; direct OA position  Pit @ 20mu    A: IUP @ 37.1  IOL, second stage  ROM x6.5hrs  Cat 1 fht  GBS Positive  GHTN  Hx Childhood Asthma    P: Midwife remains at bedside pushing with patient  Continue Pitocin per protocol  Continue PCN q4hr  Anticipate SVB    Bhavya Gallagher, CHUNG-ALLISONM

## 2024-06-29 NOTE — PROGRESS NOTES
"Assessment    18 y.o.  at 37w1d  FHT Category 1  Latent labor  IOL; on pitocin  GBS positive; on pitocin    Plan    Encourage frequent position changes as tolerated  Encourage ambulation as tolerated  Start/Continue pitocin per protocol  Pain management per patient request  Discussed with patient regarding AROM; patient desires to wait until epidural placement for AROM.  Patient will get epidural between the hours of 3481-0359 prior to AROM.  Continue assessment of maternal and fetal wellbeing  Recheck as clinically indicated by maternal or fetal status  Anticipate active phase of labor    Trinidad Zarate, APRN-CNM    Subjective:  Kaylyn Payton is positioned in low fowlers, right tilt; feeling contractions, but they are \"manageable\".  Agreeable to SVE    Objective:  Fetal Monitoring      Baseline FHR: 135 per minute  Variability: moderate  Accelerations: yes  Decelerations: none  TOCO: irregular, every 4-6 minutes    Cervical Exam:  3 cm dilated, 70 effaced, -2 station    Membrane status: intact    Pitocin is at 8 mu/min.    Vitals:    24 0027 24 0032 24 0037 24 0042   BP:       Pulse: 81 98 96 93   Resp:       Temp:       TempSrc:       SpO2: 97% 97% 97% 98%   Weight:       Height:            "

## 2024-06-29 NOTE — PROGRESS NOTES
Assessment    18 y.o.  at 37w1d  FHT Category 1  Second stage of labor    Principal Problem:    Encounter for induction of labor ([facility])  Active Problems:    Gestational hypertension, third trimester ([facility])    Anemia affecting pregnancy in third trimester ([facility])    Asthma ([facility])    Positive GBS test    Plan    Will start pushing with patient  Continue assessment of maternal and fetal wellbeing  Patient status and plan of care reviewed with Dr. Moi Madrid, APRN-CNM, APRN-CNP    Subjective:  Kaylyn Payton is comfortable with epidural, reports intermittent rectal pressure. RN reporting bloody show on pad.    Objective:  Fetal Monitoring      Baseline FHR: 150 per minute  Variability: moderate  Accelerations: yes  Decelerations: early  TOCO: regular, every 3 minutes    Cervical Exam:  10 cm dilated, 100 effaced, 0 station    Membrane status: ruptured    Pitocin is at 18 mu/min.    Vitals:    24 1728 24 1734 24 1827 24 1828   BP: 129/80  (!) 135/92    Pulse: 101 94 101 105   Resp: 18  18    Temp: 36.5 °C (97.7 °F)  36.8 °C (98.2 °F)    TempSrc: Temporal  Skin    SpO2: 98% 97% 98% 97%   Weight:       Height:

## 2024-06-29 NOTE — PROGRESS NOTES
Assessment    18 y.o.  at 37w1d    Principal Problem:    Encounter for induction of labor ([facility])  Active Problems:    Gestational hypertension, third trimester ([facility])    Anemia affecting pregnancy in third trimester ([facility])    Asthma ([facility])    Positive GBS test    FHT Category 1  Latent labor    Plan    Patient declines AROM at this time, desires to rest; will discuss again in next couple hours  Encourage frequent position changes as tolerated  Continue pitocin per protocol  PCN GBS prophylaxis  Continue assessment of maternal and fetal wellbeing  Recheck as clinically indicated by maternal or fetal status  Anticipate active phase of labor  Alee Madrid, APRN-CNM, APRN-CNP    Subjective:  Kaylyn Payton is comfortable with epidural, reports feeling tired.    Objective:  Fetal Monitoring      Baseline FHR: 150 per minute  Variability: moderate  Accelerations: yes  Decelerations: none  TOCO: regular, every 2-3 minutes    Cervical Exam:  deferred  (last exam /-2 at 0530)    Membrane status: intact    Pitocin is at 18 mu/min.    Vitals:    24 0729 24 0828 24 0836 24 0841   BP: 107/53 110/61     Pulse: 80 96 105 88   Resp: 18      Temp: 37.2 °C (99 °F)      TempSrc: Temporal      SpO2: 98%  96% 98%   Weight:       Height:

## 2024-06-29 NOTE — PROGRESS NOTES
Assessment    18 y.o.  at 37w1d  FHT Category 1  Latent labor  IOL gHTN; on pitocin  GBS positive; on PCN    Plan    Attempted to SROM; unsuccessful  Start/Continue pitocin per protocol  PCN GBS prophylaxis  Continue assessment of maternal and fetal wellbeing  Recheck as clinically indicated by maternal or fetal status  Anticipate active phase of labor    Trinidad Zarate, APRN-CNM    Subjective:  Kaylyn Payton is comfortable with her epidural; agreeable to SVE    Objective:  Fetal Monitoring      Baseline FHR: 135 per minute  Variability: moderate  Accelerations: yes  Decelerations: none  TOCO: irregular, every 2-4 minutes    Cervical Exam:  4 cm dilated, 70 effaced, -2 station    Membrane status: intact    Pitocin is at 14 mu/min.    Vitals:    24 0521 24 0526 24 0531 24 0536   BP:       Pulse: 86 88 100 85   Resp:       Temp:       TempSrc:       SpO2: 98% 99% 100% (!) 95%   Weight:       Height:

## 2024-06-29 NOTE — ANESTHESIA PREPROCEDURE EVALUATION
Patient: Kaylyn Payton    Evaluation Method: In-person visit    Procedure Information    Date: 06/29/24  Procedure: Labor Analgesia         Relevant Problems   Cardiac   (+) Acute left-sided thoracic back pain   (+) Antepartum fetal tachycardia affecting care of mother ([facility])      Pulmonary   (+) Asthma ([facility])      Neuro (within normal limits)      GI (within normal limits)      /Renal (within normal limits)      Liver (within normal limits)      Endocrine (within normal limits)      Hematology   (+) Iron deficiency anemia      Musculoskeletal (within normal limits)      GYN   (+) 32 weeks gestation of pregnancy ([facility])   (+) 34 weeks gestation of pregnancy ([facility])   (+) Supervision of normal first pregnancy, antepartum ([facility])       Clinical information reviewed:   Tobacco  Allergies  Meds   Med Hx  Surg Hx   Fam Hx          NPO Detail:  No data recorded     OB/Gyn Evaluation    Present Pregnancy    Patient is pregnant now.   Obstetric History                Physical Exam    Airway  Mallampati: I  TM distance: >3 FB  Neck ROM: full     Cardiovascular    Dental    Pulmonary    Abdominal            Anesthesia Plan    History of general anesthesia?: no  History of complications of general anesthesia?: no    ASA 2     epidural     The patient is not a current smoker.    Anesthetic plan and risks discussed with patient.  Use of blood products discussed with patient who consented to blood products.    Plan discussed with CRNA.

## 2024-06-29 NOTE — ANESTHESIA PROCEDURE NOTES
Epidural Block    Patient location during procedure: OB  Start time: 6/29/2024 4:38 AM  End time: 6/29/2024 4:55 AM  Reason for block: labor analgesia  Staffing  Performed: CRNA   Authorized by: CROW Mustafa    Performed by: CROW Mustafa    Preanesthetic Checklist  Completed: patient identified, IV checked, risks and benefits discussed, surgical consent, pre-op evaluation, timeout performed and sterile techniques followed  Block Timeout  RN/Licensed healthcare professional reads aloud to the Anesthesia provider and entire team: Patient identity, procedure with side and site, patient position, and as applicable the availability of implants/special equipment/special requirements.  Patient on coagulant treatment: no  Timeout performed at: 6/29/2024 4:38 AM  Block Placement  Patient position: sitting  Prep: ChloraPrep  Sterility prep: cap, drape, hand, gloves and mask  Sedation level: no sedation  Patient monitoring: blood pressure and continuous pulse oximetry  Approach: midline  Local numbing: lidocaine 1% to skin and subcutaneous tissues  Vertebral space: lumbar  Lumbar location: L3-L4  Epidural  Loss of resistance technique: saline  Guidance: landmark technique        Needle  Needle type: Tuohy   Needle gauge: 17  Needle length: 8.9cm  Needle insertion depth: 6.5 cm  Catheter type: multi-orifice  Catheter size: 21 G  Catheter at skin depth: 11.5 cm  Catheter securement method: clear occlusive dressing and liquid medical adhesive    Test dose: lidocaine 1.5% with epinephrine 1-to-200,000  Test dose: lidocaine 1.5% with epinephrine 1-to-200,000  Test dose result: no positive test dose    PCEA  Medication concentration used: 0.044% Bupivacaine with 1.25 mcg/mL Fentanyl and 1:[id number] Epinephrine  Dose (mL): 10  Lockout (minutes): 15  1-Hour Limit (boluses/hr): 4  Basal Rate: 14        Assessment  Sensory level: T10 bilateral  Block outcome: patient comfortable  Number of attempts: 1  Events: no  positive test dose  Procedure assessment: patient tolerated procedure well with no immediate complications

## 2024-06-30 VITALS
RESPIRATION RATE: 16 BRPM | HEIGHT: 64 IN | SYSTOLIC BLOOD PRESSURE: 142 MMHG | OXYGEN SATURATION: 96 % | DIASTOLIC BLOOD PRESSURE: 96 MMHG | BODY MASS INDEX: 35.17 KG/M2 | TEMPERATURE: 98.1 F | HEART RATE: 76 BPM | WEIGHT: 206 LBS

## 2024-06-30 PROCEDURE — 2500000004 HC RX 250 GENERAL PHARMACY W/ HCPCS (ALT 636 FOR OP/ED)

## 2024-06-30 PROCEDURE — 1210000001 HC SEMI-PRIVATE ROOM DAILY

## 2024-06-30 PROCEDURE — 2500000001 HC RX 250 WO HCPCS SELF ADMINISTERED DRUGS (ALT 637 FOR MEDICARE OP)

## 2024-06-30 RX ORDER — NIFEDIPINE 10 MG/1
10 CAPSULE ORAL ONCE AS NEEDED
Status: ACTIVE | OUTPATIENT
Start: 2024-06-30

## 2024-06-30 RX ORDER — LABETALOL HYDROCHLORIDE 5 MG/ML
20 INJECTION, SOLUTION INTRAVENOUS ONCE AS NEEDED
Status: ACTIVE | OUTPATIENT
Start: 2024-06-30

## 2024-06-30 RX ORDER — ADHESIVE BANDAGE
10 BANDAGE TOPICAL
Status: ACTIVE | OUTPATIENT
Start: 2024-06-30

## 2024-06-30 RX ORDER — ONDANSETRON 4 MG/1
4 TABLET, FILM COATED ORAL EVERY 6 HOURS PRN
Status: ACTIVE | OUTPATIENT
Start: 2024-06-30

## 2024-06-30 RX ORDER — BISACODYL 10 MG/1
10 SUPPOSITORY RECTAL DAILY PRN
Status: ACTIVE | OUTPATIENT
Start: 2024-06-30

## 2024-06-30 RX ORDER — SIMETHICONE 80 MG
80 TABLET,CHEWABLE ORAL 4 TIMES DAILY PRN
Status: ACTIVE | OUTPATIENT
Start: 2024-06-30

## 2024-06-30 RX ORDER — ENOXAPARIN SODIUM 100 MG/ML
40 INJECTION SUBCUTANEOUS EVERY 24 HOURS
Status: DISPENSED | OUTPATIENT
Start: 2024-06-30

## 2024-06-30 RX ORDER — LIDOCAINE 560 MG/1
1 PATCH PERCUTANEOUS; TOPICAL; TRANSDERMAL
Status: ACTIVE | OUTPATIENT
Start: 2024-06-30

## 2024-06-30 RX ORDER — OXYTOCIN 10 [USP'U]/ML
10 INJECTION, SOLUTION INTRAMUSCULAR; INTRAVENOUS ONCE AS NEEDED
Status: ACTIVE | OUTPATIENT
Start: 2024-06-30

## 2024-06-30 RX ORDER — METHYLERGONOVINE MALEATE 0.2 MG/ML
0.2 INJECTION INTRAVENOUS ONCE AS NEEDED
Status: ACTIVE | OUTPATIENT
Start: 2024-06-30

## 2024-06-30 RX ORDER — HYDRALAZINE HYDROCHLORIDE 20 MG/ML
5 INJECTION INTRAMUSCULAR; INTRAVENOUS ONCE AS NEEDED
Status: ACTIVE | OUTPATIENT
Start: 2024-06-30

## 2024-06-30 RX ORDER — DIPHENHYDRAMINE HCL 25 MG
25 CAPSULE ORAL EVERY 6 HOURS PRN
Status: ACTIVE | OUTPATIENT
Start: 2024-06-30

## 2024-06-30 RX ORDER — DIPHENHYDRAMINE HYDROCHLORIDE 50 MG/ML
25 INJECTION INTRAMUSCULAR; INTRAVENOUS EVERY 6 HOURS PRN
Status: ACTIVE | OUTPATIENT
Start: 2024-06-30

## 2024-06-30 RX ORDER — MISOPROSTOL 200 UG/1
800 TABLET ORAL ONCE AS NEEDED
Status: ACTIVE | OUTPATIENT
Start: 2024-06-30

## 2024-06-30 RX ORDER — TRANEXAMIC ACID 100 MG/ML
1000 INJECTION, SOLUTION INTRAVENOUS ONCE AS NEEDED
Status: ACTIVE | OUTPATIENT
Start: 2024-06-30

## 2024-06-30 RX ORDER — IBUPROFEN 600 MG/1
600 TABLET ORAL EVERY 6 HOURS PRN
Qty: 60 TABLET | Refills: 0 | OUTPATIENT
Start: 2024-06-30

## 2024-06-30 RX ORDER — OXYTOCIN/0.9 % SODIUM CHLORIDE 30/500 ML
60 PLASTIC BAG, INJECTION (ML) INTRAVENOUS ONCE AS NEEDED
Status: ACTIVE | OUTPATIENT
Start: 2024-06-30

## 2024-06-30 RX ORDER — POLYETHYLENE GLYCOL 3350 17 G/17G
17 POWDER, FOR SOLUTION ORAL 2 TIMES DAILY PRN
Status: ACTIVE | OUTPATIENT
Start: 2024-06-30

## 2024-06-30 RX ORDER — CARBOPROST TROMETHAMINE 250 UG/ML
250 INJECTION, SOLUTION INTRAMUSCULAR ONCE AS NEEDED
Status: ACTIVE | OUTPATIENT
Start: 2024-06-30

## 2024-06-30 RX ORDER — ONDANSETRON HYDROCHLORIDE 2 MG/ML
4 INJECTION, SOLUTION INTRAVENOUS EVERY 6 HOURS PRN
Status: ACTIVE | OUTPATIENT
Start: 2024-06-30

## 2024-06-30 RX ORDER — POLYETHYLENE GLYCOL 3350 17 G/17G
17 POWDER, FOR SOLUTION ORAL 2 TIMES DAILY PRN
Qty: 14 PACKET | Refills: 0 | OUTPATIENT
Start: 2024-06-30

## 2024-06-30 RX ORDER — LOPERAMIDE HYDROCHLORIDE 2 MG/1
4 CAPSULE ORAL EVERY 2 HOUR PRN
Status: ACTIVE | OUTPATIENT
Start: 2024-06-30

## 2024-06-30 RX ORDER — ACETAMINOPHEN 325 MG/1
975 TABLET ORAL EVERY 6 HOURS PRN
Qty: 120 TABLET | Refills: 0 | OUTPATIENT
Start: 2024-06-30

## 2024-06-30 ASSESSMENT — PAIN DESCRIPTION - DESCRIPTORS: DESCRIPTORS: ACHING;CRAMPING

## 2024-06-30 ASSESSMENT — PAIN SCALES - GENERAL: PAINLEVEL_OUTOF10: 5 - MODERATE PAIN

## 2024-06-30 NOTE — CARE PLAN
The patient's goals for the shift include healthy mom healthy baby    The clinical goals for the shift include healthy mom and healthy baby      Problem: Postpartum  Goal: Experiences normal postpartum course  Outcome: Progressing

## 2024-06-30 NOTE — PROGRESS NOTES
Postpartum Progress Note    Assessment/Plan   Kaylyn Payton is a 18 y.o., , who delivered at 37w1d gestation after IOL in s/o gHTN and is now postpartum day 1.    Now PPD#1 s/p  on   - continue routine postpartum care  - pain well controlled on po medications  - DVT Score: 5, for ppx lovenox  - Hgb:   Results from last 7 days   Lab Units 24  1838   HEMOGLOBIN g/dL 9.3*       gHTN  - dx by multiple mild range BPs in antepartum  - s/p nml HELLP labs on , PCR was 0.13 at that time  - Admission HELLP labs negative, P:C 0.11  - BP WNL--> low mild range postpartum on no meds  - BP cuff for home  - Discussed recommendation for 3 day stay, patient agreeable     Anemia  - hgb 9.3  - Continue home PO iron on discharge    Maternal Well-Being  - emotional support provided  - bonding with infant    [place] Feeding  - breastfeeding/pumping encouraged; lactation consult prn    Contraception  - Interval Mirena IUD    Dispo  - Anticipate DC PPD3 pending BP control.  - The signs and symptoms of PEC were reviewed with the patient, including unrelenting headache, vision changes/blurred vision, and pain underneath the right breast.   - BP cuff for home for checking BP BID. Pt instructed to call primary OB if SBP > 160 or DBP > 110.  - On discharge, follow up with primary OB in 2-5 days for BP check, 2 weeks for incision check, and 4-6 weeks for postpartum visit.       Principal Problem:    Vaginal delivery ([facility])  Active Problems:    Anemia affecting pregnancy in third trimester ([facility])    Gestational hypertension, third trimester ([facility])    Encounter for induction of labor ([facility])    Asthma ([facility])    Positive GBS test    Pregnancy Problems (from 22 to present)       Problem Noted Resolved    Pregnancy headache in third trimester ([facility]) 2024 by Krystal Ho, RN No    Priority:  Medium      Gestational hypertension, third trimester ([facility]) 2024 by Gunjan Null, APRN-CNP No     Priority:  Medium      Overview Addendum 6/25/2024  3:20 PM by PHONG Sutton     Dx by mild range BP readings <4hrs apart  IOL scheduled for 6/28 at 5pm         Anemia affecting pregnancy in third trimester ([facility]) 4/9/2024 by PHONG Gutiérrez No    Priority:  Medium      Overview Addendum 6/25/2024  3:22 PM by PHONG Sutton     4/9: Started on iron    Most recent CBC on 6/18 9.6; scheduled for infusion on 6/28  IOL also scheduled for 6/28           Syncope 2/12/2024 by PHONG Gutiérrez No    Priority:  Medium      Overview Signed 2/12/2024 10:13 AM by PHONG Gutiérrez     Encouraged high protein and increasing water intake          Encounter for induction of labor ([facility]) 12/27/2023 by PHONG Arteaga No    Priority:  Medium      Overview Signed 6/29/2024  8:36 AM by PHONG Crawford, ERIN     >>OVERVIEW FOR SUPERVISION OF NORMAL FIRST PREGNANCY, ANTEPARTUM ([facility]) WRITTEN ON 6/12/2024 12:20 PM BY PHONG ELLIS APRN-CNP    Desired provider in labor: [x] CNM  [] Physician  [x] Initial BMI: 33.13  [x] Prenatal Labs: WNL except rubella non immune  [x] Rh status: A+  [x] Genetic Screening: rr cf DNA   [x] Baby ASA: prescribed 1/15  [x] Dating confirmation with US done [facility]     [x] Anatomy US: WNL 2/28  [x] Fetal Sex: female  [x] Patient added to BirthTracks  [x] 1hr GCT at 24-28wks: WNL at 25w    [x] Tdap (27-36wks):  [x] Flu Shot: completed 12/27/23    [x] Breastfeeding - pump ordered   [x] Pain management during labor: planning epidural   [x] Postpartum Birth control method:  Mirena IUD immediate PP  [] GBS at 36 wks:  [] Labor support:         Positive GBS test 6/29/2024 by PHONG Crawford, APRN-CNP No    Priority:  Low      Initial obstetric visit in first trimester ([facility]) 12/27/2023 by PHONG Hein 1/13/2024 by PHONG Gutiérrez    10 weeks  gestation of pregnancy ([facility]) 12/27/2023 by PHONG Hein 1/13/2024 by CHUNG Gutiérrez-KATELYN          Hospital course: gestational hypertension  Vaginal Birth  Patient is currently breastfeedingThe patient's blood type is A POS. The baby's blood type is pending . Rhogam is not indicated.    Subjective   Her pain is well controlled with current medications  She is passing flatus  She is ambulating well  She is tolerating a Adult diet Regular  She reports no breast or nursing problems  She denies emotional concerns today   Her plan for contraception is interval IUD w/progesterone     Denies HA, SOB, RUQ pain, vision changes.   denies dizziness/lightheadedness/LOC/SOB/uncontrolled bleeding     Objective   Allergies:   Orange juice and Reglan [metoclopramide hcl]         Last Vitals:  Temp Pulse Resp BP MAP Pulse Ox   37.2 °C (99 °F) 87 16 122/84   96 %     Vitals Min/Max Last 24 Hours:  Temp  Min: 36 °C (96.8 °F)  Max: 37.2 °C (99 °F)  Pulse  Min: 73  Max: 152  Resp  Min: 15  Max: 20  BP  Min: 115/65  Max: 155/90    Intake/Output:     Intake/Output Summary (Last 24 hours) at 6/30/2024 1618  Last data filed at 6/29/2024 2010  Gross per 24 hour   Intake --   Output 1275 ml   Net -1275 ml       Physical Exam:  General: Examination reveals a well developed, well nourished, female, in no acute distress. She is alert and cooperative.  Lungs: symmetrical, non-labored breathing.  Cardiac: warm, well-perfused.  Abdomen: soft, non-tender.  Fundus: firm, below umbilicus, and nontender.  Extremities: no redness or tenderness in the calves or thighs.  Neurological: alert, oriented, normal speech, no focal findings or movement disorder noted.     Lab Data:  Labs in chart were reviewed.

## 2024-06-30 NOTE — LACTATION NOTE
Lactation Consultant Note  Lactation Consultation  Reason for Consult: Initial assessment  Consultant Name: Bibiana Mejia RN, IBCLC    Maternal Information  Has mother  before?: No  Infant to breast within first 2 hours of birth?: Yes  Exclusive Pump and Bottle Feed: No    Maternal Assessment  Breast Assessment: Medium, Symmetrical, Compressible, Other (Comment) (expressible - left more expressible than the right)  Nipple Assessment: Intact, Erect  Areola Assessment: Normal    Infant Assessment  Infant Behavior: Readiness to feed, Feeding cues observed, Suckles on and off, needs stimulation  Infant Assessment: Palate - high/arch/bubble/normal, Tongue humped/bunched/retracted/elevated    Feeding Assessment  Nutrition Source: Breastmilk  Feeding Method: Nursing at the breast  Feeding Position: C - hold, Cross - cradle, Cradle, Football/seated, Skin to skin, Both sides, Nipple to nose, Mother needs assistance with latch/positioning  Suck/Feeding: Sustained, Coordinated suck/swallow/breathe, Tactile stimulation needed, Swallowing intermittently only with encouragement  Latch Assessment: Maximum assistance is needed, Instructed on deep latch, Areolar attachment, Deep latch obtained, Optimal angle of mouth opening, Comfortable with no pain, Flanged lips, Comfortable latch    LATCH TOOL  Latch: Repeated attempts, hold nipple in mouth, stimulate to suck  Audible Swallowing: A few with stimulation  Type of Nipple: Everted (After stimulation)  Comfort (Breast/Nipple): Soft/non-tender  Hold (Positioning): Full assist, staff holds infant at breast  LATCH Score: 6    Breast Pump       Other OB Lactation Tools       Patient Follow-up  Inpatient Lactation Follow-up Needed : Yes  Outpatient Lactation Follow-up: Recommended    Other OB Lactation Documentation  Maternal Risk Factors: Hypertension, Extreme tiredness, fatigue or stress  Infant Risk Factors: Early term birth 37-39  weeks    Recommendations/Summary  Baby around 12 hours of life at time of visit.   Mom was attempting to latch the baby (feeding cues seen). I offered her assistance with latching and she was agreeable.   She stated the nurses fed the baby donor breast milk last night d/t baby wouldn't latch.   Reviewed medical reasons to supplement if needed per Peds.     Reviewed positioning of baby (in cross cradle hold on the right breast and football hold on the left breast), use of pillow support, hand placement on baby and on breast, expression of colostrum to the nipple prior to latching (mom very expressible on the right),  latching technique, and use of breast compression and stimulation to keep the baby feeding at the breast longer.    Deep latch obtained after multiple attempts to keep the baby latched and feeding. Baby sleepy and would fall asleep and lose the suction and then wake up and root.   Eventually the baby settled into the feeding on the right breast in cross cradle to cradle hold. Noted swallows.   Mom denies any pain with the latch.     Reviewed feeding cues, the normal feeding patterns in the first 24 hours of life, the role of colostrum, output on different days of life, milk production/ supply in the first few days of life, and the benefits of skin to skin.      Encouraged mom to breastfeed on demand with a goal of 8-12 feeds in a 24 hour period.   If baby is not showing feeding cues and it has been 3 hours since the last time the baby was fed or the last time the baby attempted to feed, encouraged her to place baby in skin to skin.    Encouraged her to call for assistance from bedside RN, if needed.     She stated she has a breast pump for at home.   Questions answered.     Encouraged her to utilize the outpatient lactation resources, if needed.   Contact information given.   [phone] [facility] or [phone] [place]

## 2024-06-30 NOTE — L&D DELIVERY NOTE
OB Delivery Note  2024  Kaylyn Payton  18 y.o.   Vaginal, Spontaneous        Gestational Age: 37w1d  /Para:   Quantitative Blood Loss: Admission to Discharge: 75 mL (2024  4:52 PM - 2024  8:29 PM)    Marcelo Payton [[id number]]      Labor Events    Rupture date/time: 2024 1310  Fluid color: Clear  Fluid odor: None  Labor type: Induced Onset of Labor  Labor allowed to proceed with plans for an attempted vaginal birth?: Yes  Induction: [place]/[facility]ASI, Oxytocin  First cervical ripening date/time: 2024  Induction date/time: 2024  Induction indications: Hypertensive Disorder of Pregnancy  Complications: None       Labor Event Times    Labor onset date/time: 2024 165  Dilation complete date/time: 2024 1835  Start pushing date/time: 2024 1843       Placenta    Placenta delivery date/time: 2024  Placenta removal: Spontaneous  Placenta appearance: Intact  Placenta disposition: pathology       Cord    Vessels: 3 vessels  Complications: Nuchal  Nuchal intervention: reduced  Nuchal cord description: loose nuchal cord  Number of loops: 1  Delayed cord clamping?: Yes  Cord clamped date/time: 2024  Cord blood disposition: Discarded  Gases sent?: No  Stem cell collection (by provider): No       Lacerations    Episiotomy: None  Perineal laceration: None  Other lacerations?: No  Repair suture: None       Anesthesia    Method: Epidural       Operative Delivery    Forceps attempted?: No  Vacuum extractor attempted?: No       Shoulder Dystocia    Shoulder dystocia present?: No        Delivery    Birth date/time: 2024 20:00:00  Delivery type: Vaginal, Spontaneous  Complications: None       Resuscitation    Method: Tactile stimulation       Apgars    Living status: Living  Apgar Component Scores:  1 min.:  5 min.:  10 min.:  15 min.:  20 min.:    Skin color:  0  1       Heart rate:  2  2       Reflex irritability:  2  2       Muscle tone:  2  2        Respiratory effort:  2  2       Total:  8  9       Apgars assigned by: TERESA POLLOCK       Delivery Providers    Delivering clinician: PHONG Arteaga   Provider Role    Deedee Sanchez, RN Delivery Nurse    Sera Randolph, RN Nursery Nurse     Resident               Fetal descent noted throughout pushing efforts. Hands-on perineum at  with controlled extension of fetal head. Shoulders delivered swiftly following restitution. Postpartum pitocin bolus initiated. Vigorous infant placed on maternal abdomen for skin to skin. Cord cut by FOB after delayed cord clamping. Placenta delivered spontaneously and in tact with gentle traction on umbilical cord. Fundus palpated firm, midline, and at umbilicus. Vagina, perineum, and labia inspected. All intact, no lacerations noted.     PHONG Arteaga

## 2024-06-30 NOTE — DISCHARGE INSTRUCTIONS

## 2024-07-01 ENCOUNTER — PHARMACY VISIT (OUTPATIENT)
Dept: PHARMACY | Facility: CLINIC | Age: 19
End: 2024-07-01
Payer: MEDICAID

## 2024-07-01 VITALS
DIASTOLIC BLOOD PRESSURE: 77 MMHG | HEIGHT: 64 IN | TEMPERATURE: 98.2 F | RESPIRATION RATE: 18 BRPM | SYSTOLIC BLOOD PRESSURE: 137 MMHG | BODY MASS INDEX: 35.17 KG/M2 | HEART RATE: 87 BPM | WEIGHT: 206 LBS | OXYGEN SATURATION: 97 %

## 2024-07-01 PROBLEM — Z34.90 ENCOUNTER FOR INDUCTION OF LABOR (HHS-HCC): Status: RESOLVED | Noted: 2023-12-27 | Resolved: 2024-07-01

## 2024-07-01 PROCEDURE — 2500000004 HC RX 250 GENERAL PHARMACY W/ HCPCS (ALT 636 FOR OP/ED)

## 2024-07-01 PROCEDURE — 90471 IMMUNIZATION ADMIN: CPT

## 2024-07-01 PROCEDURE — RXMED WILLOW AMBULATORY MEDICATION CHARGE

## 2024-07-01 PROCEDURE — 90707 MMR VACCINE SC: CPT

## 2024-07-01 PROCEDURE — 2500000001 HC RX 250 WO HCPCS SELF ADMINISTERED DRUGS (ALT 637 FOR MEDICARE OP)

## 2024-07-01 PROCEDURE — 99238 HOSP IP/OBS DSCHRG MGMT 30/<: CPT

## 2024-07-01 ASSESSMENT — PAIN SCALES - GENERAL
PAINLEVEL_OUTOF10: 2
PAINLEVEL_OUTOF10: 0 - NO PAIN

## 2024-07-01 ASSESSMENT — PAIN DESCRIPTION - DESCRIPTORS: DESCRIPTORS: CRAMPING;SORE

## 2024-07-01 NOTE — DISCHARGE SUMMARY
"Discharge Summary    Kaylyn Payton is a 18 y.o. year old female   Admission Date: 6/28/2024  Discharge Date: 07/01/24       Discharge Diagnosis  Spontaneous vaginal delivery    Hospital Course  Delivery Date: 6/29/2024  8:00 PM   Delivery type: Vaginal, Spontaneous    GA at delivery: 37w1d   Outcome: Living   Intrapartum complications: None   Feeding method: Breastfeeding Status: Yes     gHTN  - dx by multiple mild range BPs in antepartum  - s/p nml HELLP labs on 6/18, PCR was 0.13 at that time  - Admission HELLP labs negative, P:C 0.11  - BP WNL--> low mild range postpartum on no meds  - Asymptomatic  - BP cuff for home    Anemia  - hgb 9.3  - Continue home PO iron    Procedures: none  Contraception at discharge: Interval Mirena.  We discussed pregnancy spacing of at least one year, abstaining from intercourse for 6wks, and the ability to become pregnant in the absence of regular menses. Pt verbalized understanding.    Pertinent Physical Exam At Time of Discharge    General: Examination reveals a well developed, well nourished, female, in no acute distress. She is alert and cooperative.  Lungs: symmetrical, non-labored breathing.  Cardiac: warm, well-perfused.  Abdomen: soft, non-tender.  Fundus: firm, below umbilicus, and nontender.  Extremities: no redness or tenderness in the calves or thighs.  Neurological: alert, oriented, normal speech, no focal findings or movement disorder noted.      Vitals  /73 (BP Location: Right arm, Patient Position: Lying)   Pulse 78   Temp 36.5 °C (97.7 °F) (Temporal)   Resp 17   Ht 1.626 m (5' 4\")   Wt 93.4 kg (206 lb)   SpO2 97%   Breastfeeding Yes   BMI 35.36 kg/m²      Discharge Meds     Your medication list        START taking these medications        Instructions Last Dose Given Next Dose Due   ibuprofen 600 mg tablet      Take 1 tablet (600 mg) by mouth every 6 hours if needed for mild pain (1 - 3) or moderate pain (4 - 6).       polyethylene glycol 17 gram " packet  Commonly known as: Glycolax, Miralax      Take 17 g by mouth 2 times a day as needed (constipation).              CHANGE how you take these medications        Instructions Last Dose Given Next Dose Due   acetaminophen 325 mg tablet  Commonly known as: Tylenol  What changed:   medication strength  how much to take  reasons to take this      Take 3 tablets (975 mg) by mouth every 6 hours if needed for mild pain (1 - 3) or moderate pain (4 - 6).              CONTINUE taking these medications        Instructions Last Dose Given Next Dose Due   docusate sodium 100 mg capsule  Commonly known as: Colace      Take 1 capsule (100 mg) by mouth as needed at bedtime for constipation.       ferrous gluconate 240 (27 Fe) MG tablet  Commonly known as: Fergon      Take 2 tablets (480 mg) by mouth every other day. One pill twice a day every other day       [id number]-dha-epa-fish oil 400 mcg-35 mg -25 mg-5 mg tablet,chewable      Chew 400 mcg once daily.              STOP taking these medications      cyclobenzaprine 10 mg tablet  Commonly known as: Flexeril        diphenhydrAMINE 25 mg tablet  Commonly known as: Sominex                  Where to Get Your Medications        These medications were sent to [facility]  [address] [place] [address]      Hours: 8:30 AM to 5 PM Mon-Fri Phone: [phone]   acetaminophen 325 mg tablet  ibuprofen 600 mg tablet  polyethylene glycol 17 gram packet          Complications Requiring Follow-Up  None    Test Results Pending At Discharge  Pending Labs       Order Current Status    Surgical Pathology Exam - [facility] In process            Outpatient Follow-Up  I have reviewed with the patient the standard 3 day stay for hypertensive disorders and the risks/benefits of early discharge, including death, stroke, seizure, and readmission. I strongly recommended that the patient stay for the recommended 3 days. She verbalizes understanding of risks. She verbalizes  understanding of symptoms and BPs that warrant immediate medical attention, and desires discharge home today.     Follow up with your OB provider in 2-5 days for BP check and in 4-6 weeks for postpartum visit     I spent 20 minutes in the professional and overall care of this patient.      Yanni Wilson, APRN-CNP

## 2024-07-01 NOTE — CARE PLAN
The patient's goals for the shift include shower    The clinical goals for the shift include Patient will maintain normal vital signs    Patient is a two day vaginal delivery with GHTN,  vss, assessment stable, pain well controlled, lochia light to scant. Patient feeding infant often, bonding well with infant. Patient is clear for discharge.

## 2024-07-01 NOTE — LACTATION NOTE
Lactation Consultant Note  Lactation Consultation  Reason for Consult: Follow-up assessment, Breast/nipple pain  Consultant Name: Bibiana Mejia RN, IBCLC    Maternal Information       Maternal Assessment  Breast Assessment: Medium, Symmetrical, Compressible  Nipple Assessment: Sore, Red/inflamed, Bruised, Other (Comment), Intact (right has bruise on tip of the nipple)  Alterations in Nipple Condition: Stage I - pain or irritation with no skin break down  Areola Assessment: Normal    Infant Assessment  Infant Behavior: Deep sleep, Other (Comment) (mom recently fed the baby at the breast)    Feeding Assessment  Nutrition Source: Breastmilk  Feeding Method: Nursing at the breast  Unable to assess infant feeding at this time: Other (Comment) (did not view a latch d/t baby just finished feeding at the breast)    LATCH TOOL       Breast Pump  Pump: Hospital grade electric pump, Double breast pumping, Massage  Frequency: Other (comment) (encouraged her to pump for 20 minutes on the side the baby is having difficulty (if the baby does not latch to it))  Breast Shield Size and Type: 21 mm, Other (comment) (mom measures 18 MM on both breasts- advised she will need 19 MM/ 20 MM/ or 21 MM breast flanges for pumping at home.)    Other OB Lactation Tools  Lactation Tools: Flanges, Lanolin    Patient Follow-up  Outpatient Lactation Follow-up: Recommended    Other OB Lactation Documentation  Maternal Risk Factors: Hypertension  Infant Risk Factors: Early term birth 37-39 weeks    Recommendations/Summary  I did not view a latch at this time d/t mom had recently fed the baby at the breast and the baby appears content.     Mom stated she is able to independently latch the baby to the right breast, struggles at times for a deep latch (bruised tip of the nipple) and denies any pain after the initial latch on. But, she verbalized she struggles at times to latch the baby to the left breast.   Discussed if the baby isn't latching  to this breast consistently, she should start to pump this breast for 20 minutes with each feeding while she continues to work on latching.   I offered to set her up to pump and mom was agreeable.     Oriented mom to pump set up- use- and cleaning of pump parts.   She measures to be 18 MM on both nipples- advised she would need 19 MM/ 20 MM/ or 21 MM breast flanges for pumping for at home pump.    Reviewed the difference between latching and pumping, the benefit of skin to skin, the benefits of breast massage prior to pumping, expectations of volume with pumping, milk storage and cleaning of pump parts.     PI-123 and [facility] pump cleaning guide given.     Encouraged mom to breastfeed on demand with a goal of 8-12 feeds in a 24 hour period.   If baby is not showing feeding cues and it has been 3 hours since the last time the baby was fed or the last time the baby attempted to feed, encouraged her to place baby in skin to skin.    If the baby does not latch to the left breast; encouraged her to pump  for 20 minutes and to give the baby any pumped breast milk obtained.   Once the baby is latching to the left breast; she does not need to pump; just latch.     Mom is hoping to be discharged to home today- dependent on her blood pressures.     Mom has a breast pump for at home.   Denies any questions at this time.     Encouraged her to utilize the outpatient lactation resources, if needed.   Contact information given.   [phone] [facility] or [phone] [place]

## 2024-07-02 ENCOUNTER — LACTATION CONSULT (OUTPATIENT)
Dept: LACTATION | Facility: HOSPITAL | Age: 19
End: 2024-07-02
Payer: COMMERCIAL

## 2024-07-02 DIAGNOSIS — O92.70 UNSPECIFIED DISORDERS OF LACTATION (HHS-HCC): Primary | ICD-10-CM

## 2024-07-02 PROCEDURE — 99211 OFF/OP EST MAY X REQ PHY/QHP: CPT | Performed by: OBSTETRICS & GYNECOLOGY

## 2024-07-03 NOTE — PROGRESS NOTES
Lactation Counseling Note    Subjective:    Kaylyn Payton is a 18 y.o. patient referred for lactation counseling. She is here today due to  Pediatric visit . She was referred by her Pediatrician Dr. Serna and Dr. Kenney .     OB HISTORY:   Baby Name: Ni Payton  Healthcare Provider: OB/GYN Marie Trevino CNM  Prenatal Screening: Negative  Maternal Blood Type: A positive  /Para: : 2  Para: 1  Weeks Gestation: 37  Mode of Delivery: Normal vaginal route  Induction/Augmentation  Epidural/General Anesthesia  Delivery Complications: None  Maternal Complications: Positive GBS  Preeclampsia  [place] Information: Baby's Name: Ni Payton  : 24  MRN: [id number]  Place of Birth: [facility] [place]  Healthcare Provider: GIOVANY  APGARS: 1 minute: 8  5 minutes: 9  Skin to skin contact: First hour  Birth parameters: AGA  Birth weight: 2900 gm  Birth length: 48 cm  Discharge weight: 2770 gm    Objective:    BREASTFEEDING ASSESSMENT:   Physical Exam    Baby Name: Ni  Breast Assessment: Large, Symmetrical, Filling, Soft, Compressible, and Readiness to feed  Nipple Assessment/Stage: erect, scabbed, and sore    Areola: Normal  Suck/Feeding: strong suck no attempt to latch onto breast.  Alternative Feeding Methods: paced bottle  Feeding and Cleaning instructions reviewed for: Paced bottle  Infant Feeding Method: Breast milk only  Were you feeding your baby only breast milk when you were discharged from the hospital at the time of birth? Yes    Age of baby while still breastfeeding, but added formula: 3 days  Infant Behavior: active alert, deep sleep, readiness to feed, feeding cues observed, rooting response, content after feeding, and    Infant Information: Tongue protrudes over alveolar ridge  Good cupping of tongue  Good lateral movement of the tongue  Able to elevate tongue to roof of mouth  Fontanel: soft  Mucous Membranes: moist  Breast Pain: Pain scale 0-10 (10 most pain): 0  Pain description: none  Nipple Pain: Pain scale 0-10  "(10 most pain): 4  Pain description: sore    Weight: Reported pediatrician visit weight: 2622 gm  Date of pediatrician weight: 24  Weight before feedin gm  Number of voids in the last 24 hours: 2  Number of stools in the last 24 hours: 2    PATIENT DISCUSSION SUMMARY:    LACTATION PROBLEMS AND STRATEGIES:  Baby slow weight gain: Baby needs to latch deeply to breast  Do not wait until baby cries to feed. Watch for feeding cues-baby actively moving, mouth open, sucking, eye movement  Follow pediatrician's recommendations on use of supplements  If nipple shield is used, be sure baby using shield correctly and transferring milk  Increase number of feedings per day. Nurse at least 10-12 times per day  Keep all sucking at the breast, avoid use of soothers (pacifiers)  Offer both breasts each feeding  See physician to rule out medical problem  Use breast compression if baby does not suckle actively  Use strategies to boost milk supply. See PI-162 breastfeeding: Tips to Increase Milk Supply  Late / near term: Avoid over stimulation  Baby may need extra time and help with feedings  Baby may tire easily  Baby should be gaining 1/2-1 oz per day  Express breast milk to maintain milk supply  Keep a feeding & pumping log  Keep baby \"skin to skin\"   Listen or feel for swallows every 1-3 sucks  Massage your breast during feedings  Monitor baby's weight gain- Support group  PI sheet- Tips to Increase Milk Supply  PI sheet- Is My  Baby Getting Enough  Provide extra support while feeding  Pumping should continue until baby 40-42 weeks  Supplement with expressed breast milk if necessary  Try  \"power pumping\"  Use clutch, cross cradle or semi-reclined feeding position  Use nipple shield to assist with latch  Watch for feeding cues and nurse baby (8-12 times or more in 24 hours)  Nipple blisters/sores: Apply very warm compress to blister to soften it  Immediately put baby to affected breast after applying " compresses  Infectious sore on nipple such as herpes require immediate physician treatment  Pay attention to good positioning and attachment  Review comfort measures for sore nipples once blisters are open  Seek treatment from physician to open blisters if compress treatment fails  White or clear blisters may be caused by a plug or skin blocking milk flow  Problems latching baby to breast: Baby should latch to areola (dark area) not just the nipple.  Baby's lips should be flanged outward.  Bring the baby up to the level of your breast by putting a pillow under the baby.  Do not use bottles or pacifiers until latching on well.  Dribble milk over the nipple or express milk so that baby can taste it  Encourage a deeper latch with the asymetrical latch- lining baby's nose opposite mother's nipple.  Encourage nipple to stand up prior to feeing by pumping, nipple rolling or by brief application of ice.  Gently tickle your baby's lip with your nipple to encourage your baby to open his/her mouth wide.  Hold baby so that your breast is positioned deep in the baby's mouth.  Hold your baby close, tummy to tummy.  If baby does not latch to breast, express breast milk.  If engorged, express some milk to soften breast.  If the baby is not latched on well, break seal and gently try again.  Keep baby skin to skin and watch for feeding cues.  Massage breast to start milk-ejection reflex.  Place nipple and at least 1 inch of areola into baby's mouth.  Place your hand behind the baby's neck and shoulder.  Do not force baby's head into breast.  Put baby in the football hold or clutch hold, supporting his/her neck and head in sniffing position to open his/her throat.  Shape breast into oval shape (sandwich hold)  for deep latch.  Try different feeding positions (cradle, football, side etc.).  Use a breast feeding pillow to bring baby up to the level of the breast.  Use nipple shield and monitor baby's weight gain and output.  Use  "semi-reclined feeding position to allow baby to take an active role and trigger baby's inborn feeding behavior.  Use your hand to support your breast during feeding.  When your baby's mouth is wide open, quickly pull baby into your breast.  Your baby's chin should be pressed into your breast.  Pumping pointers: Air dry nipples, express milk and rub in or use an approved nipple cream after expressing., Allow 10  to 15 minutes per breast for single pumping, and 10 to 15 minutes total for double pumping., Apply heat to breasts before pumping., Choose a familiar comfortable place to express milk., If nipples are sore use less pressure and pump more frequently for shorter times., If nipples are sore, center nipple in flange, try a larger flange, or try a different pump., Listen to a tape of baby while pumping., Look at a photo of baby wile expressing., Massage breasts before and during pumping., Minimize distractions., Moisten flange before beginning to improve seal., Pick a good time of day to begin (early in a.m., during the baby's long sleep stretch, when skipping a feeding, etc.)., Relax for 5 minutes before pumping., Stimulate the nipples and hand express a few drops before pumping., Switch breasts when flow lessens., and Use pumping bra/band for \"hands free\" pumping.  Return to work/school: Begin expressing 2 weeks before returning to work or school.  Begin introducing a bottle after 4 to 6 weeks of age or when breastfeeding is well established.  Begin work or school midweek if possible.  Breastfeed frequently when together in evening and weekend.  Breastfeed immediately upon returning from work or school.  Breastfeed the baby immediately before leaving for work or school.  Express milk about every three hours while  from baby.  Instruct caregiver on how to store and feed expressed milk.  Remind caregiver not to feed baby immediately before mom's return.  Sore nipple (any stage): Air dry nipples between " "feedings.  Check bra for tightness or rough seams.  Discontinue use of creams and ointments.  Discontinue use of soaps or irritating substances.  Express milk before feeding to encourage rapid letdown.  Gave PI Sheet (PI-167) on Sore and Cracked Nipples.  If nipples have eczema, rinse food particles from baby's mouth before nursing.  Review latch on and positioning.  See Sore Nipples/Early for more information.  See physician for unusual rashes or sores on nipples.  Use relaxation and breathing techniques.  Vary nursing positions.  Wash nipple wounds with soapy warm water once per day.  Sore nipple (early): After feeding use approved nipple cream or hydrogel pads  Allow baby to self latch  Begin feeding with least sore breast  Between feedings use breast shells with large openings to reduce clothing friction or pressure  Break suction with finger before removing baby from breast  Breastfeed first on the least sore breast until milk releases, then move baby gently to the more painful breast  Check positioning and attachment throughout the feeding  Contact physician for nipple evaluation and treatment  Discontinue use of bottles and pacifiers until soreness subsides  Do not rub expressed milk into broken skin  Do not use plastic lined breast pads. Change breast pads frequently  Dr. Valenzuela's \"Sore Nipples-Engorgement\" Valenzuela publishing 2005  Expose nipples to sunlight  If engorged, express to soften areola  If experiencing burning or stinging nipple, check for thrush  If nipple pain is too intense to continue breastfeeding, express breastmilk and feed baby expressed breastmilk  If nipple skin is broken, apply a thin coating of a topical antibiotic to prevent infection  Massage breasts and hand express a small amount of milk to encourage let down before feeding  PI sheet on BF with sore and cracked nipples given  Place baby skin-to-skin on your bare chest  Purified lanolin ointment maybe used on nipples after feedings for " comfort  Stimulate a milk ejection by expressing some milk before putting baby to breast  Try holding the baby in different positions (vertical, horizontal, or at 45 degree angle ) for feedings  Use nipple shield to reduce pain during breastfeeding  Use relaxation and breathing techniques to help with pain of latch on  Wash hands before touching breasts and nipples. Rinse nipples with clean warm water  Watch for feeding cues and feed baby  When sore on bottom, make sure lower lip is flanged out and relaxed  When sore on top, correct the latch on and check tongue position  When the tip is sore, center the nipple when latching on, check for tongue thrusting, and check for short frenulum  PATIENT INSTRUCTION HANDOUTS GIVEN:   Laid back breastfeeding  Kangaroo care (PI# 223)  How to keep your breast pump kit clean  Foods that promote good milk production  Breastfeeding: Tips to increase your milk supply (PI# 162)  Breastfeeding: Sore and cracked nipples (PI# 167)  Breastfeeding: Returning to work(PI# 165)  Breastfeeding: Engorgement (PI# 163)  Breastfeeding: Diet and fluid intake (PI# 168)  Bottle-Feeding a Breast-fed Baby  The first month, Sister to sister, Building words  LACTATION EDUCATION:  Waking Techniques, Correct Positioning, Correct Latch On, and The first month, Sister to Sister, Building words, . Present at the consult were mom Kaylyn ,Dad Rocky , and baby Ni. No latch was attempted because Ni's blood glucose was low and her urine output was only 2 voids in 24 hours. Dr. Kenney wanted her fed formula and repeat the D stick post feed. She was full and sleepy after consuming at least an ounce.  I was able to express some colostrum from the right breast. The breast assessment was interrupted. Rocky was very involved  during the consult feeding Ni the formula skin to skin. Ni had a large void during the consult. The plan is to  latch Ni onto the breast ,both sides ideally for 15 minutes. And  give at least 1 ounce of formula. Also pump after each feeding. We reinforced with both parents they are doing a very good job. The intention is to have lactation support  available when they tomorrow. Both doctors were given verbal report about the session.

## 2024-07-05 ENCOUNTER — APPOINTMENT (OUTPATIENT)
Dept: OBSTETRICS AND GYNECOLOGY | Facility: HOSPITAL | Age: 19
End: 2024-07-05
Payer: COMMERCIAL

## 2024-07-05 LAB
LABORATORY COMMENT REPORT: NORMAL
PATH REPORT.FINAL DX SPEC: NORMAL
PATH REPORT.GROSS SPEC: NORMAL
PATH REPORT.RELEVANT HX SPEC: NORMAL
PATH REPORT.TOTAL CANCER: NORMAL

## 2024-07-11 ENCOUNTER — DOCUMENTATION (OUTPATIENT)
Dept: BEHAVIORAL HEALTH | Facility: CLINIC | Age: 19
End: 2024-07-11

## 2024-07-11 ENCOUNTER — CLINICAL SUPPORT (OUTPATIENT)
Dept: OBSTETRICS AND GYNECOLOGY | Facility: CLINIC | Age: 19
End: 2024-07-11
Payer: COMMERCIAL

## 2024-07-11 VITALS — DIASTOLIC BLOOD PRESSURE: 95 MMHG | BODY MASS INDEX: 32.1 KG/M2 | WEIGHT: 187 LBS | SYSTOLIC BLOOD PRESSURE: 132 MMHG

## 2024-07-11 NOTE — PROGRESS NOTES
Met with Pt, tow weeks postpartum, at [facility] breastfeeding support group. Pt reports inability to sleep, feelings of hypervigilance around care of baby, difficulty allowing other people to care for baby (FOB and his parents), and difficulty expressing needs and boundaries. Pt is interested in a referral to    [facility]. Will be referred today. Will continue to fu throughout  period.

## 2024-07-19 ENCOUNTER — LACTATION ENCOUNTER (OUTPATIENT)
Dept: PEDIATRIC ICU | Facility: HOSPITAL | Age: 19
End: 2024-07-19

## 2024-07-19 NOTE — LACTATION NOTE
This note was copied from a baby's chart.  Lactation Consultant Note  Lactation Consultation   Belle Hoff RN IBCLC    Recommendations/Summary       I spoke with parents at pt's bedside to explained availability of Lactation Consult services. Provided written patient education instruction materials on the listed topics: ANNE-MARIE, Benefits of mother's own milk for the infant, breast massage and hand expression,[facility] pump cleaning & sanitizing guidelines. Mom reports that the baby is nursing well and that her latch has improved in the past few days.  She is going to continue to breastfeed as is for now. She reports that she has no concerns at this time.  I Invited to contact LC services as needed.

## 2024-07-25 ENCOUNTER — LACTATION ENCOUNTER (OUTPATIENT)
Dept: PEDIATRIC ICU | Facility: HOSPITAL | Age: 19
End: 2024-07-25

## 2024-07-25 NOTE — LACTATION NOTE
This note was copied from a baby's chart.  Mom states she needs a pump for home use because she does not like her pump at home. I explained to mom that I cannot order another one through her insurance.  Offered [facility] [employer] breastpump for home use while infant remains hospitalized. Terms of rental agreement reviewed. Mom stated she would have to think about it and she would let me know. Mom states pumping is going well and she breastfeeds Ni 2x/day. Encouraged mom to contact lactation with any questions or concerns.

## 2024-08-05 ENCOUNTER — OFFICE VISIT (OUTPATIENT)
Dept: OBSTETRICS AND GYNECOLOGY | Facility: HOSPITAL | Age: 19
End: 2024-08-05
Payer: COMMERCIAL

## 2024-08-05 ENCOUNTER — APPOINTMENT (OUTPATIENT)
Dept: BEHAVIORAL HEALTH | Facility: CLINIC | Age: 19
End: 2024-08-05
Payer: COMMERCIAL

## 2024-08-05 VITALS — SYSTOLIC BLOOD PRESSURE: 131 MMHG | WEIGHT: 183 LBS | DIASTOLIC BLOOD PRESSURE: 90 MMHG | BODY MASS INDEX: 31.41 KG/M2

## 2024-08-05 DIAGNOSIS — Z30.013 ENCOUNTER FOR INITIAL PRESCRIPTION OF INJECTABLE CONTRACEPTIVE: ICD-10-CM

## 2024-08-05 LAB — PREGNANCY TEST URINE, POC: NEGATIVE

## 2024-08-05 PROCEDURE — 96372 THER/PROPH/DIAG INJ SC/IM: CPT

## 2024-08-05 PROCEDURE — 1036F TOBACCO NON-USER: CPT

## 2024-08-05 PROCEDURE — 99213 OFFICE O/P EST LOW 20 MIN: CPT

## 2024-08-05 PROCEDURE — 2500000004 HC RX 250 GENERAL PHARMACY W/ HCPCS (ALT 636 FOR OP/ED)

## 2024-08-05 PROCEDURE — 81025 URINE PREGNANCY TEST: CPT

## 2024-08-05 PROCEDURE — 3080F DIAST BP >= 90 MM HG: CPT

## 2024-08-05 PROCEDURE — 3075F SYST BP GE 130 - 139MM HG: CPT

## 2024-08-05 RX ORDER — MEDROXYPROGESTERONE ACETATE 150 MG/ML
150 INJECTION, SUSPENSION INTRAMUSCULAR ONCE
Status: COMPLETED | OUTPATIENT
Start: 2024-08-05 | End: 2024-08-05

## 2024-08-05 SDOH — ECONOMIC STABILITY: FOOD INSECURITY: WITHIN THE PAST 12 MONTHS, THE FOOD YOU BOUGHT JUST DIDN'T LAST AND YOU DIDN'T HAVE MONEY TO GET MORE.: NEVER TRUE

## 2024-08-05 SDOH — ECONOMIC STABILITY: FOOD INSECURITY: WITHIN THE PAST 12 MONTHS, YOU WORRIED THAT YOUR FOOD WOULD RUN OUT BEFORE YOU GOT MONEY TO BUY MORE.: NEVER TRUE

## 2024-08-05 ASSESSMENT — ENCOUNTER SYMPTOMS
CARDIOVASCULAR NEGATIVE: 0
ENDOCRINE NEGATIVE: 0
NEUROLOGICAL NEGATIVE: 0
HEMATOLOGIC/LYMPHATIC NEGATIVE: 0
RESPIRATORY NEGATIVE: 0
EYES NEGATIVE: 0
MUSCULOSKELETAL NEGATIVE: 0
PSYCHIATRIC NEGATIVE: 0
GASTROINTESTINAL NEGATIVE: 0
OCCASIONAL FEELINGS OF UNSTEADINESS: 0
CONSTITUTIONAL NEGATIVE: 0
DEPRESSION: 0
LOSS OF SENSATION IN FEET: 0
ALLERGIC/IMMUNOLOGIC NEGATIVE: 0

## 2024-08-05 ASSESSMENT — PATIENT HEALTH QUESTIONNAIRE - PHQ9
2. FEELING DOWN, DEPRESSED OR HOPELESS: NOT AT ALL
1. LITTLE INTEREST OR PLEASURE IN DOING THINGS: NOT AT ALL
SUM OF ALL RESPONSES TO PHQ9 QUESTIONS 1 & 2: 0

## 2024-08-05 ASSESSMENT — EDINBURGH POSTNATAL DEPRESSION SCALE (EPDS)
THE THOUGHT OF HARMING MYSELF HAS OCCURRED TO ME: NEVER
I HAVE BLAMED MYSELF UNNECESSARILY WHEN THINGS WENT WRONG: NOT VERY OFTEN
I HAVE FELT SCARED OR PANICKY FOR NO GOOD REASON: YES, SOMETIMES
I HAVE BEEN SO UNHAPPY THAT I HAVE HAD DIFFICULTY SLEEPING: NOT AT ALL
I HAVE LOOKED FORWARD WITH ENJOYMENT TO THINGS: AS MUCH AS I EVER DID
I HAVE BEEN SO UNHAPPY THAT I HAVE BEEN CRYING: NO, NEVER
I HAVE BEEN ANXIOUS OR WORRIED FOR NO GOOD REASON: YES, SOMETIMES
THINGS HAVE BEEN GETTING ON TOP OF ME: NO, MOST OF THE TIME I HAVE COPED QUITE WELL
TOTAL SCORE: 6
I HAVE BEEN ABLE TO LAUGH AND SEE THE FUNNY SIDE OF THINGS: AS MUCH AS I ALWAYS COULD
I HAVE FELT SAD OR MISERABLE: NO, NOT AT ALL

## 2024-08-05 ASSESSMENT — PAIN SCALES - GENERAL: PAINLEVEL: 0-NO PAIN

## 2024-08-05 NOTE — PROGRESS NOTES
"IMPRESSIONS:  18 y.o. yo  here for - -     Postpartum examination following SVB, normal recovery course  - Returning to exercise and sex discussed. Patient is cleared. Strengthening pelvic floor with Kegels encouraged before high impact exercise to prevent weakening reviewed.   - Discussed using lubrication with sexual intercourse as estrogen levels are lower r/t breastfeeding / pumping and vaginal mucosa may be dry because of this. Patient verbalizes understanding.   - Encouraged patient to continue to monitor for signs or symptoms of  mood and anxiety disorders  - Routine follow up with PCP for health maintenance examination encouraged   - Encouraged continued breastfeeding. Patient aware resources are available should she need them.   - Warning s/s discussed. All questions answered.    Contraception  - Postpartum contraception discussed - patient elects Depo  - risks/benefits/side effects discussed, all questions answered  - First Depo injection provided today.  - Encouraged back-up form of contraception or continued abstinence x1 week.    Cervical cancer screening  - Reviewed ASCCP guidelines with patient; PAP screening to begin at age 21    F/U in 12 weeks for next Depo injection, or as needed.     CHUNG Arteaga-KATELYN    Subjective     18 y.o. yo  presents for her 6 week postpartum follow up. She is s/p SVB on 24.     She shares that she had a terrific labor and birth experience. Her daughter Ni was diagnosed with a VSD at 2 weeks of life after a heart murmer was heard at her pediatrician visit. Ni now has an NG tube at home now is working on gaining weight prior to scheduling heart surgery. Otherwise, she is doing well and is a \"happy baby\".     Pregnancy c/b: GHTN, Class II obesity, Anemia, Asthma, GBS +  Delivery notable for: intact perineum, 75cc QBL    PP Recovery:   Feeding - pumping  Perineal Discomfort - denies  Depression - Denies s/s depression.  See PP " depression screen.  Emotional Support - supported by mother  Bowel Symptoms - Negative for abdominal discomfort, blood in stool or black stool, and negative change in bowel habits.  Bladder Symptoms - No dysuria, denies hematuria, urinary frequency, urinary urgency or incontinence.   Lochia resolved.  Menses Since Delivery - Not resumed   Menstrual Pattern Prior to Pregnancy - Monthly.   Watchtower since Delivery - No    Contraception Plan - Depo    Pap Hx: N/A    Objective      Visit Vitals  /90   Wt 83 kg (183 lb)   Breastfeeding Yes   BMI 31.41 kg/m²   OB Status Recent pregnancy   Smoking Status Never   BSA 1.94 m²        Physical Exam  Vitals reviewed.   Constitutional:       General: She is not in acute distress.     Appearance: Normal appearance.   HENT:      Head: Normocephalic and atraumatic.   Pulmonary:      Effort: Pulmonary effort is normal.   Abdominal:      General: Abdomen is flat.      Palpations: Abdomen is soft.      Tenderness: There is no abdominal tenderness.      Comments: no diastasis recti   Genitourinary:     General: Normal vulva.      Exam position: Lithotomy position.      Pubic Area: No rash.       Labia:         Right: No rash, lesion or injury.         Left: No rash, lesion or injury.       Urethra: No prolapse, urethral pain or urethral swelling.      Vagina: Normal.      Cervix: Normal. No discharge, lesion or cervical bleeding.      Uterus: Normal. Not enlarged and not tender.       Comments: Good vaginal support, Uterine involution complete  Musculoskeletal:         General: Normal range of motion.      Cervical back: Normal range of motion.   Skin:     General: Skin is warm and dry.   Neurological:      Mental Status: She is alert and oriented to person, place, and time.   Psychiatric:         Mood and Affect: Mood normal.         Behavior: Behavior normal.         Thought Content: Thought content normal.         Judgment: Judgment normal.

## 2024-09-30 ENCOUNTER — LACTATION ENCOUNTER (OUTPATIENT)
Dept: PEDIATRIC ICU | Facility: HOSPITAL | Age: 19
End: 2024-09-30

## 2024-09-30 NOTE — LACTATION NOTE
This note was copied from a baby's chart.  Lactation Consultant Note  Lactation Consultation   Belle Hoff RN IBCLC    Recommendations/Summary        I spoke with mom at pt's bedside to explained availability of the [facility] LC services.  Instructed on listed patient education: ANNE-MARIE, Benefits of mother's own milk for the infant, breast massage and hand expression,[facility] pump cleaning & sanitizing guidelines.  Mom reports that she has sore nipples. She measures at around 19 mm and has been using a 21 mm flange.  She was instructed to trial a 24 mm flange as she may  have elastic nipples ans this could cause the pinching she feels with pumping.  Mom was given lanolin to use in the flange with pumping to decrease the friction she feels.  Additionally, I gave her a pair of small pump n pals to trial.  Baby came back as COVID pos so mom was instructed on how to handle her expressed MBM and keep her equipment clean.  Mom is concerned that her supply has dropped and she would like to increase it.  She was encouraged to pump more often and to include a overnight pumping.  Mom was invited to contact LC services if she is not feeling better with this changed.

## 2024-10-07 ENCOUNTER — APPOINTMENT (OUTPATIENT)
Dept: OBSTETRICS AND GYNECOLOGY | Facility: CLINIC | Age: 19
End: 2024-10-07
Payer: COMMERCIAL

## 2024-10-14 ENCOUNTER — APPOINTMENT (OUTPATIENT)
Dept: OBSTETRICS AND GYNECOLOGY | Facility: CLINIC | Age: 19
End: 2024-10-14
Payer: COMMERCIAL

## 2024-10-24 ENCOUNTER — APPOINTMENT (OUTPATIENT)
Dept: OBSTETRICS AND GYNECOLOGY | Facility: HOSPITAL | Age: 19
End: 2024-10-24
Payer: COMMERCIAL

## 2025-04-28 ENCOUNTER — APPOINTMENT (OUTPATIENT)
Dept: OBSTETRICS AND GYNECOLOGY | Facility: HOSPITAL | Age: 20
End: 2025-04-28
Payer: COMMERCIAL

## 2025-05-05 ENCOUNTER — APPOINTMENT (OUTPATIENT)
Facility: CLINIC | Age: 20
End: 2025-05-05
Payer: COMMERCIAL

## 2025-06-17 ENCOUNTER — HOSPITAL ENCOUNTER (OUTPATIENT)
Dept: RADIOLOGY | Facility: CLINIC | Age: 20
Discharge: HOME | End: 2025-06-17
Payer: COMMERCIAL

## 2025-06-17 ENCOUNTER — APPOINTMENT (OUTPATIENT)
Dept: PRIMARY CARE | Facility: CLINIC | Age: 20
End: 2025-06-17
Payer: COMMERCIAL

## 2025-06-17 VITALS
BODY MASS INDEX: 36.43 KG/M2 | HEIGHT: 64 IN | WEIGHT: 213.4 LBS | SYSTOLIC BLOOD PRESSURE: 136 MMHG | OXYGEN SATURATION: 97 % | HEART RATE: 111 BPM | DIASTOLIC BLOOD PRESSURE: 80 MMHG

## 2025-06-17 DIAGNOSIS — M25.551 PAIN OF RIGHT HIP: ICD-10-CM

## 2025-06-17 DIAGNOSIS — M70.71 ILIOPSOAS BURSITIS OF RIGHT HIP: Primary | Chronic | ICD-10-CM

## 2025-06-17 DIAGNOSIS — M54.50 CHRONIC MIDLINE LOW BACK PAIN WITHOUT SCIATICA: ICD-10-CM

## 2025-06-17 DIAGNOSIS — G89.29 CHRONIC MIDLINE LOW BACK PAIN WITHOUT SCIATICA: ICD-10-CM

## 2025-06-17 DIAGNOSIS — J45.20 MILD INTERMITTENT ASTHMA, UNSPECIFIED WHETHER COMPLICATED (HHS-HCC): ICD-10-CM

## 2025-06-17 DIAGNOSIS — E66.812 CLASS 2 OBESITY DUE TO EXCESS CALORIES WITHOUT SERIOUS COMORBIDITY WITH BODY MASS INDEX (BMI) OF 36.0 TO 36.9 IN ADULT: ICD-10-CM

## 2025-06-17 DIAGNOSIS — Z13.89 SCREENING FOR OBESITY: ICD-10-CM

## 2025-06-17 DIAGNOSIS — E66.09 CLASS 2 OBESITY DUE TO EXCESS CALORIES WITHOUT SERIOUS COMORBIDITY WITH BODY MASS INDEX (BMI) OF 36.0 TO 36.9 IN ADULT: ICD-10-CM

## 2025-06-17 PROCEDURE — 3079F DIAST BP 80-89 MM HG: CPT | Performed by: INTERNAL MEDICINE

## 2025-06-17 PROCEDURE — 72100 X-RAY EXAM L-S SPINE 2/3 VWS: CPT

## 2025-06-17 PROCEDURE — 1036F TOBACCO NON-USER: CPT | Performed by: INTERNAL MEDICINE

## 2025-06-17 PROCEDURE — 73502 X-RAY EXAM HIP UNI 2-3 VIEWS: CPT | Mod: RIGHT SIDE | Performed by: RADIOLOGY

## 2025-06-17 PROCEDURE — 3008F BODY MASS INDEX DOCD: CPT | Performed by: INTERNAL MEDICINE

## 2025-06-17 PROCEDURE — 99214 OFFICE O/P EST MOD 30 MIN: CPT | Performed by: INTERNAL MEDICINE

## 2025-06-17 PROCEDURE — 72100 X-RAY EXAM L-S SPINE 2/3 VWS: CPT | Performed by: RADIOLOGY

## 2025-06-17 PROCEDURE — 3075F SYST BP GE 130 - 139MM HG: CPT | Performed by: INTERNAL MEDICINE

## 2025-06-17 PROCEDURE — 73502 X-RAY EXAM HIP UNI 2-3 VIEWS: CPT | Mod: RT

## 2025-06-17 RX ORDER — DICLOFENAC SODIUM 10 MG/G
4 GEL TOPICAL 4 TIMES DAILY
Qty: 100 G | Refills: 1 | Status: SHIPPED | OUTPATIENT
Start: 2025-06-17

## 2025-06-17 ASSESSMENT — PAIN SCALES - GENERAL: PAINLEVEL_OUTOF10: 8

## 2025-06-17 ASSESSMENT — PATIENT HEALTH QUESTIONNAIRE - PHQ9
2. FEELING DOWN, DEPRESSED OR HOPELESS: NOT AT ALL
1. LITTLE INTEREST OR PLEASURE IN DOING THINGS: NOT AT ALL
SUM OF ALL RESPONSES TO PHQ9 QUESTIONS 1 AND 2: 0
2. FEELING DOWN, DEPRESSED OR HOPELESS: NOT AT ALL
1. LITTLE INTEREST OR PLEASURE IN DOING THINGS: NOT AT ALL
SUM OF ALL RESPONSES TO PHQ9 QUESTIONS 1 AND 2: 0

## 2025-06-17 NOTE — PROGRESS NOTES
"Patient ID: Kaylyn Payton is a 19 y.o. female who presents for Hip Pain (NPV HIP PAIN).    /80   Pulse (!) 111   Ht 1.626 m (5' 4\")   Wt 96.8 kg (213 lb 6.4 oz)   SpO2 97%   BMI 36.63 kg/m²     HPI        PT IS HERE FOR EVALUATION OF HIP   DURATION 2 YEARS ON AND OFF   IT IS AFFECTING MY DAILY CHORES AND   AFFECTING HER WORK   I AM TAKING TYLENOL AND IBUPROFEN   OTC TWICE A DAY   I AM ALSO HER IN MY LOWER BACK     NO INJURY OR TRAUMA           Subjective     Review of Systems    Objective     Physical Exam  Vitals and nursing note reviewed.   Constitutional:       Appearance: Normal appearance. She is obese.   Neck:      Vascular: No carotid bruit.   Cardiovascular:      Rate and Rhythm: Normal rate and regular rhythm.      Pulses: Normal pulses.      Heart sounds: Normal heart sounds. No murmur heard.  Pulmonary:      Effort: Pulmonary effort is normal.      Breath sounds: Normal breath sounds.   Abdominal:      Palpations: There is no mass.   Musculoskeletal:      Right lower leg: No edema.      Left lower leg: No edema.      Comments: RT HIP ABDUCTION PAINFUL   EXTERNAL ROTATION PAINFUL   SLIGHT TENDERNESS OVER THE   GREATER TROCHANTER RT HIP    Neurological:      General: No focal deficit present.      Mental Status: She is oriented to person, place, and time. Mental status is at baseline.   Psychiatric:         Mood and Affect: Mood normal.         Behavior: Behavior normal.         Thought Content: Thought content normal.         Judgment: Judgment normal.         Lab Results   Component Value Date    WBC 10.4 06/28/2024    HGB 9.3 (L) 06/28/2024    HCT 30.4 (L) 06/28/2024    MCV 88 06/28/2024     06/28/2024           Problem List Items Addressed This Visit       Asthma    STABLE         Class 2 obesity due to excess calories without serious comorbidity with body mass index (BMI) of 36.0 to 36.9 in adult    Screening for obesity     Other Visit Diagnoses         Iliopsoas bursitis of right hip  " (Chronic)   -  Primary    Relevant Medications    diclofenac sodium (Voltaren) 1 % gel      Pain of right hip        Relevant Orders    XR hip right with pelvis when performed 2 or 3 views      Chronic midline low back pain without sciatica        Relevant Orders    XR lumbar spine 2-3 views                A/P         X-RAY LUMBAR SPINE   X-RAY RT HIP  VOLTAREN GEL 1% APPLY TOPICALLY   TO THE AREA AREA ON THE HIP   Discussed with the patient the effect of morbid obesity and overall all-cause mortality  Discussed with the patient the effect of morbid obesity and physical mental wellbeing  Discussed with the patient the effect of morbid obesity on cardiovascular cerebrovascular health  Discussed with the patient the effect of morbid obesity on hypertension,,diabetes, obstructive sleep apnea fatal arrhythmias and sudden death  Discussed with the patient the effect of morbid obesity on cancer/malignancy, chronic kidney health, chronic pain, depression  She is aware she needs to cut back total caloric intake and total portion size

## 2025-08-07 ENCOUNTER — APPOINTMENT (OUTPATIENT)
Facility: CLINIC | Age: 20
End: 2025-08-07
Payer: COMMERCIAL

## 2025-08-21 ENCOUNTER — APPOINTMENT (OUTPATIENT)
Facility: CLINIC | Age: 20
End: 2025-08-21
Payer: COMMERCIAL

## 2025-10-07 ENCOUNTER — APPOINTMENT (OUTPATIENT)
Dept: OBSTETRICS AND GYNECOLOGY | Facility: CLINIC | Age: 20
End: 2025-10-07
Payer: COMMERCIAL